# Patient Record
Sex: MALE | Race: BLACK OR AFRICAN AMERICAN | NOT HISPANIC OR LATINO | Employment: OTHER | URBAN - NONMETROPOLITAN AREA
[De-identification: names, ages, dates, MRNs, and addresses within clinical notes are randomized per-mention and may not be internally consistent; named-entity substitution may affect disease eponyms.]

---

## 2018-10-01 ENCOUNTER — HOSPITAL ENCOUNTER (INPATIENT)
Facility: HOSPITAL | Age: 28
LOS: 1 days | Discharge: HOME | End: 2018-10-02
Attending: FAMILY MEDICINE | Admitting: FAMILY MEDICINE

## 2018-10-01 DIAGNOSIS — M62.82 NON-TRAUMATIC RHABDOMYOLYSIS: Primary | ICD-10-CM

## 2018-10-01 PROBLEM — N17.9 ACUTE RENAL FAILURE DUE TO RHABDOMYOLYSIS: Status: ACTIVE | Noted: 2018-10-01

## 2018-10-01 PROBLEM — E66.09 CLASS 1 OBESITY DUE TO EXCESS CALORIES WITHOUT SERIOUS COMORBIDITY WITH BODY MASS INDEX (BMI) OF 32.0 TO 32.9 IN ADULT: Status: ACTIVE | Noted: 2018-10-01

## 2018-10-01 PROBLEM — E87.6 HYPOKALEMIA: Status: ACTIVE | Noted: 2018-10-01

## 2018-10-01 PROBLEM — R00.0 SINUS TACHYCARDIA: Status: ACTIVE | Noted: 2018-10-01

## 2018-10-01 LAB
ALBUMIN SERPL-MCNC: 5.1 G/DL (ref 3.4–4.8)
ALBUMIN/GLOB SERPL: 1.3 G/DL (ref 1.1–1.8)
ALP SERPL-CCNC: 113 U/L (ref 38–126)
ALT SERPL W P-5'-P-CCNC: 43 U/L (ref 21–72)
AMPHET+METHAMPHET UR QL: POSITIVE
ANION GAP SERPL CALCULATED.3IONS-SCNC: 24 MMOL/L (ref 5–15)
AST SERPL-CCNC: 95 U/L (ref 17–59)
BACTERIA UR QL AUTO: ABNORMAL /HPF
BARBITURATES UR QL SCN: NEGATIVE
BASOPHILS # BLD AUTO: 0.01 10*3/MM3 (ref 0–0.2)
BASOPHILS NFR BLD AUTO: 0 % (ref 0–2)
BENZODIAZ UR QL SCN: NEGATIVE
BILIRUB SERPL-MCNC: 1.4 MG/DL (ref 0.2–1.3)
BILIRUB UR QL STRIP: NEGATIVE
BUN BLD-MCNC: 12 MG/DL (ref 7–21)
BUN/CREAT SERPL: 8.1 (ref 7–25)
CALCIUM SPEC-SCNC: 10.5 MG/DL (ref 8.4–10.2)
CANNABINOIDS SERPL QL: NEGATIVE
CHLORIDE SERPL-SCNC: 98 MMOL/L (ref 95–110)
CK MB SERPL-CCNC: 10 NG/ML (ref 0–5)
CK SERPL-CCNC: 1512 U/L (ref 55–170)
CK SERPL-CCNC: 2532 U/L (ref 55–170)
CLARITY UR: CLEAR
CO2 SERPL-SCNC: 15 MMOL/L (ref 22–31)
COCAINE UR QL: NEGATIVE
COLOR UR: YELLOW
CREAT BLD-MCNC: 1.48 MG/DL (ref 0.7–1.3)
DEPRECATED RDW RBC AUTO: 41.7 FL (ref 35.1–43.9)
EOSINOPHIL # BLD AUTO: 0.01 10*3/MM3 (ref 0–0.7)
EOSINOPHIL NFR BLD AUTO: 0 % (ref 0–7)
ERYTHROCYTE [DISTWIDTH] IN BLOOD BY AUTOMATED COUNT: 13.1 % (ref 11.5–14.5)
GFR SERPL CREATININE-BSD FRML MDRD: 69 ML/MIN/1.73 (ref 77–179)
GLOBULIN UR ELPH-MCNC: 3.9 GM/DL (ref 2.3–3.5)
GLUCOSE BLD-MCNC: 194 MG/DL (ref 60–100)
GLUCOSE UR STRIP-MCNC: NEGATIVE MG/DL
HCT VFR BLD AUTO: 47.8 % (ref 39–49)
HGB BLD-MCNC: 17.2 G/DL (ref 13.7–17.3)
HGB UR QL STRIP.AUTO: NEGATIVE
HOLD SPECIMEN: NORMAL
HYALINE CASTS UR QL AUTO: ABNORMAL /LPF
IMM GRANULOCYTES # BLD: 0.09 10*3/MM3 (ref 0–0.02)
IMM GRANULOCYTES NFR BLD: 0.4 % (ref 0–0.5)
KETONES UR QL STRIP: ABNORMAL
LEUKOCYTE ESTERASE UR QL STRIP.AUTO: ABNORMAL
LYMPHOCYTES # BLD AUTO: 3.6 10*3/MM3 (ref 0.6–4.2)
LYMPHOCYTES NFR BLD AUTO: 17.8 % (ref 10–50)
MAGNESIUM SERPL-MCNC: 1.9 MG/DL (ref 1.6–2.3)
MCH RBC QN AUTO: 31 PG (ref 26.5–34)
MCHC RBC AUTO-ENTMCNC: 36 G/DL (ref 31.5–36.3)
MCV RBC AUTO: 86.3 FL (ref 80–98)
METHADONE UR QL SCN: NEGATIVE
MONOCYTES # BLD AUTO: 1.58 10*3/MM3 (ref 0–0.9)
MONOCYTES NFR BLD AUTO: 7.8 % (ref 0–12)
NEUTROPHILS # BLD AUTO: 14.89 10*3/MM3 (ref 2–8.6)
NEUTROPHILS NFR BLD AUTO: 74 % (ref 37–80)
NITRITE UR QL STRIP: NEGATIVE
NRBC BLD MANUAL-RTO: 0 /100 WBC (ref 0–0)
OPIATES UR QL: NEGATIVE
OXYCODONE UR QL SCN: NEGATIVE
PH UR STRIP.AUTO: 6 [PH] (ref 5–9)
PLATELET # BLD AUTO: 390 10*3/MM3 (ref 150–450)
PMV BLD AUTO: 10.6 FL (ref 8–12)
POTASSIUM BLD-SCNC: 2.7 MMOL/L (ref 3.5–5.1)
POTASSIUM BLD-SCNC: 3.4 MMOL/L (ref 3.5–5.1)
POTASSIUM BLD-SCNC: 3.7 MMOL/L (ref 3.5–5.1)
PROT SERPL-MCNC: 9 G/DL (ref 6.3–8.6)
PROT UR QL STRIP: NEGATIVE
RBC # BLD AUTO: 5.54 10*6/MM3 (ref 4.37–5.74)
RBC # UR: ABNORMAL /HPF
REF LAB TEST METHOD: ABNORMAL
SODIUM BLD-SCNC: 137 MMOL/L (ref 137–145)
SP GR UR STRIP: 1 (ref 1–1.03)
SQUAMOUS #/AREA URNS HPF: ABNORMAL /HPF
TROPONIN I SERPL-MCNC: 0.03 NG/ML
UROBILINOGEN UR QL STRIP: ABNORMAL
WBC NRBC COR # BLD: 20.18 10*3/MM3 (ref 3.2–9.8)
WBC UR QL AUTO: ABNORMAL /HPF
WHOLE BLOOD HOLD SPECIMEN: NORMAL

## 2018-10-01 PROCEDURE — 82550 ASSAY OF CK (CPK): CPT | Performed by: FAMILY MEDICINE

## 2018-10-01 PROCEDURE — 80307 DRUG TEST PRSMV CHEM ANLYZR: CPT | Performed by: FAMILY MEDICINE

## 2018-10-01 PROCEDURE — 85025 COMPLETE CBC W/AUTO DIFF WBC: CPT | Performed by: FAMILY MEDICINE

## 2018-10-01 PROCEDURE — 93005 ELECTROCARDIOGRAM TRACING: CPT | Performed by: FAMILY MEDICINE

## 2018-10-01 PROCEDURE — 99221 1ST HOSP IP/OBS SF/LOW 40: CPT | Performed by: FAMILY MEDICINE

## 2018-10-01 PROCEDURE — 81001 URINALYSIS AUTO W/SCOPE: CPT | Performed by: FAMILY MEDICINE

## 2018-10-01 PROCEDURE — 25010000003 POTASSIUM CHLORIDE 10 MEQ/100ML SOLUTION: Performed by: FAMILY MEDICINE

## 2018-10-01 PROCEDURE — 83735 ASSAY OF MAGNESIUM: CPT | Performed by: FAMILY MEDICINE

## 2018-10-01 PROCEDURE — 99285 EMERGENCY DEPT VISIT HI MDM: CPT

## 2018-10-01 PROCEDURE — 93010 ELECTROCARDIOGRAM REPORT: CPT | Performed by: INTERNAL MEDICINE

## 2018-10-01 PROCEDURE — 82553 CREATINE MB FRACTION: CPT | Performed by: FAMILY MEDICINE

## 2018-10-01 PROCEDURE — 80053 COMPREHEN METABOLIC PANEL: CPT | Performed by: FAMILY MEDICINE

## 2018-10-01 PROCEDURE — 84484 ASSAY OF TROPONIN QUANT: CPT | Performed by: FAMILY MEDICINE

## 2018-10-01 PROCEDURE — 84132 ASSAY OF SERUM POTASSIUM: CPT | Performed by: FAMILY MEDICINE

## 2018-10-01 RX ORDER — ACETAMINOPHEN 325 MG/1
650 TABLET ORAL EVERY 4 HOURS PRN
Status: DISCONTINUED | OUTPATIENT
Start: 2018-10-01 | End: 2018-10-02 | Stop reason: HOSPADM

## 2018-10-01 RX ORDER — SODIUM CHLORIDE 9 MG/ML
125 INJECTION, SOLUTION INTRAVENOUS CONTINUOUS
Status: DISCONTINUED | OUTPATIENT
Start: 2018-10-01 | End: 2018-10-01

## 2018-10-01 RX ORDER — SENNA AND DOCUSATE SODIUM 50; 8.6 MG/1; MG/1
2 TABLET, FILM COATED ORAL NIGHTLY PRN
Status: DISCONTINUED | OUTPATIENT
Start: 2018-10-01 | End: 2018-10-02 | Stop reason: HOSPADM

## 2018-10-01 RX ORDER — POTASSIUM CHLORIDE 750 MG/1
40 CAPSULE, EXTENDED RELEASE ORAL ONCE
Status: COMPLETED | OUTPATIENT
Start: 2018-10-01 | End: 2018-10-01

## 2018-10-01 RX ORDER — LORAZEPAM 1 MG/1
1 TABLET ORAL EVERY 6 HOURS PRN
Status: DISCONTINUED | OUTPATIENT
Start: 2018-10-01 | End: 2018-10-02 | Stop reason: HOSPADM

## 2018-10-01 RX ORDER — NICOTINE 21 MG/24HR
1 PATCH, TRANSDERMAL 24 HOURS TRANSDERMAL EVERY 24 HOURS
Status: DISCONTINUED | OUTPATIENT
Start: 2018-10-01 | End: 2018-10-02 | Stop reason: HOSPADM

## 2018-10-01 RX ORDER — SODIUM CHLORIDE 0.9 % (FLUSH) 0.9 %
1-10 SYRINGE (ML) INJECTION AS NEEDED
Status: DISCONTINUED | OUTPATIENT
Start: 2018-10-01 | End: 2018-10-02 | Stop reason: HOSPADM

## 2018-10-01 RX ORDER — POTASSIUM CHLORIDE 7.45 MG/ML
10 INJECTION INTRAVENOUS
Status: DISCONTINUED | OUTPATIENT
Start: 2018-10-01 | End: 2018-10-02 | Stop reason: HOSPADM

## 2018-10-01 RX ORDER — POTASSIUM CHLORIDE 7.45 MG/ML
10 INJECTION INTRAVENOUS ONCE
Status: COMPLETED | OUTPATIENT
Start: 2018-10-01 | End: 2018-10-01

## 2018-10-01 RX ORDER — POTASSIUM CHLORIDE 750 MG/1
40 CAPSULE, EXTENDED RELEASE ORAL 2 TIMES DAILY WITH MEALS
Status: DISCONTINUED | OUTPATIENT
Start: 2018-10-01 | End: 2018-10-02

## 2018-10-01 RX ORDER — SODIUM CHLORIDE 9 MG/ML
100 INJECTION, SOLUTION INTRAVENOUS CONTINUOUS
Status: DISCONTINUED | OUTPATIENT
Start: 2018-10-01 | End: 2018-10-02 | Stop reason: HOSPADM

## 2018-10-01 RX ADMIN — SODIUM CHLORIDE 1000 ML/HR: 9 INJECTION, SOLUTION INTRAVENOUS at 22:33

## 2018-10-01 RX ADMIN — SODIUM CHLORIDE 1000 ML/HR: 9 INJECTION, SOLUTION INTRAVENOUS at 19:29

## 2018-10-01 RX ADMIN — SODIUM CHLORIDE 125 ML/HR: 900 INJECTION, SOLUTION INTRAVENOUS at 12:40

## 2018-10-01 RX ADMIN — POTASSIUM CHLORIDE 40 MEQ: 750 CAPSULE, EXTENDED RELEASE ORAL at 13:30

## 2018-10-01 RX ADMIN — SODIUM CHLORIDE 1000 ML/HR: 9 INJECTION, SOLUTION INTRAVENOUS at 19:39

## 2018-10-01 RX ADMIN — SODIUM CHLORIDE 1000 ML/HR: 9 INJECTION, SOLUTION INTRAVENOUS at 16:39

## 2018-10-01 RX ADMIN — POTASSIUM CHLORIDE 40 MEQ: 750 CAPSULE, EXTENDED RELEASE ORAL at 17:51

## 2018-10-01 RX ADMIN — NICOTINE 1 PATCH: 21 PATCH TRANSDERMAL at 16:34

## 2018-10-01 RX ADMIN — SODIUM CHLORIDE 2925 ML: 900 INJECTION, SOLUTION INTRAVENOUS at 13:39

## 2018-10-01 RX ADMIN — LORAZEPAM 1 MG: 1 TABLET ORAL at 16:20

## 2018-10-01 RX ADMIN — SODIUM CHLORIDE 1000 ML/HR: 9 INJECTION, SOLUTION INTRAVENOUS at 16:34

## 2018-10-01 RX ADMIN — POTASSIUM CHLORIDE 10 MEQ: 10 INJECTION, SOLUTION INTRAVENOUS at 13:31

## 2018-10-01 RX ADMIN — SODIUM CHLORIDE 1000 ML: 900 INJECTION, SOLUTION INTRAVENOUS at 11:50

## 2018-10-01 RX ADMIN — SODIUM CHLORIDE 125 ML/HR: 900 INJECTION, SOLUTION INTRAVENOUS at 13:10

## 2018-10-01 NOTE — PLAN OF CARE
Problem: Patient Care Overview  Goal: Plan of Care Review  Outcome: Ongoing (interventions implemented as appropriate)  Pt oriented to [address]. Pt's anxiety level dropped after prn dose of Ativan.

## 2018-10-01 NOTE — ED PROVIDER NOTES
"Subjective   Pt states that he \"went down on a girl last night\" and her vaginal had a biter taste and was noted to be \"powdery,\" and he immediately started \"feeling bad.\"  He has not felt normal since then.        Illness   Location:  Generalized  Severity:  Mild  Onset quality:  Gradual  Duration:  1 day  Timing:  Constant  Progression:  Unchanged  Chronicity:  New  Relieved by:  Nothing  Worsened by:  Nothing  Ineffective treatments:  None tried  Associated symptoms: fatigue, myalgias and shortness of breath    Associated symptoms: no abdominal pain, no chest pain, no congestion, no cough, no diarrhea, no ear pain, no fever, no headaches, no loss of consciousness, no nausea, no rash, no rhinorrhea, no sore throat, no vomiting and no wheezing        Review of Systems   Constitutional: Positive for diaphoresis and fatigue. Negative for appetite change, chills and fever.   HENT: Negative for congestion, ear discharge, ear pain, nosebleeds, rhinorrhea, sinus pressure, sore throat and trouble swallowing.    Eyes: Negative for discharge and redness.   Respiratory: Positive for shortness of breath. Negative for apnea, cough, chest tightness and wheezing.    Cardiovascular: Negative for chest pain.   Gastrointestinal: Negative for abdominal pain, diarrhea, nausea and vomiting.   Endocrine: Negative for polyuria.   Genitourinary: Negative for dysuria, frequency and urgency.   Musculoskeletal: Positive for myalgias. Negative for neck pain.   Skin: Negative for color change and rash.   Allergic/Immunologic: Negative for immunocompromised state.   Neurological: Positive for dizziness. Negative for seizures, loss of consciousness, syncope, weakness, light-headedness and headaches.   Hematological: Negative for adenopathy. Does not bruise/bleed easily.   Psychiatric/Behavioral: Negative for behavioral problems and confusion.   All other systems reviewed and are negative.      History reviewed. No pertinent past medical " history.    No Known Allergies    History reviewed. No pertinent surgical history.    Family History   Problem Relation Age of Onset   • Heart disease Mother        Social History     Social History   • Marital status: Single     Social History Main Topics   • Smoking status: Current Every Day Smoker     Packs/day: 1.00   • Smokeless tobacco: Never Used   • Alcohol use Yes   • Drug use: Yes     Types: Marijuana   • Sexual activity: Yes     Partners: Female     Other Topics Concern   • Not on file           Objective   Physical Exam   Constitutional: He is oriented to person, place, and time. He appears well-developed and well-nourished.   HENT:   Head: Normocephalic and atraumatic.   Nose: Nose normal.   Mouth/Throat: Oropharynx is clear and moist.   Eyes: Pupils are equal, round, and reactive to light. Conjunctivae and EOM are normal. Right eye exhibits no discharge. Left eye exhibits no discharge. No scleral icterus.   Neck: Normal range of motion. Neck supple. No tracheal deviation present.   Cardiovascular: Regular rhythm and normal heart sounds.  Tachycardia present.    No murmur heard.  Pulmonary/Chest: Effort normal and breath sounds normal. No stridor. No respiratory distress. He has no wheezes. He has no rales.   Abdominal: Soft. Bowel sounds are normal. He exhibits no distension and no mass. There is no tenderness. There is no rebound and no guarding.   Musculoskeletal: He exhibits no edema.   Neurological: He is alert and oriented to person, place, and time. Coordination normal.   Skin: Skin is warm and dry. No rash noted. No erythema.   Psychiatric: He has a normal mood and affect. His behavior is normal. Thought content normal.   Nursing note and vitals reviewed.      ECG 12 Lead    Date/Time: 10/1/2018 1:49 PM  Performed by: DI GARCÍA  Authorized by: DI GARCÍA   Interpreted by physician  Rhythm: sinus tachycardia  Rate: tachycardic  BPM: 168  ST Segments: ST segments  normal                   ED Course          Labs Reviewed   COMPREHENSIVE METABOLIC PANEL - Abnormal; Notable for the following:        Result Value    Glucose 194 (*)     Creatinine 1.48 (*)     Potassium 2.7 (*)     CO2 15.0 (*)     Calcium 10.5 (*)     Total Protein 9.0 (*)     Albumin 5.10 (*)     AST (SGOT) 95 (*)     Total Bilirubin 1.4 (*)     eGFR  African Amer 69 (*)     Globulin 3.9 (*)     Anion Gap 24.0 (*)     All other components within normal limits   CK - Abnormal; Notable for the following:     Creatine Kinase 2,532 (*)     All other components within normal limits   CK MB - Abnormal; Notable for the following:     CKMB 10.00 (*)     All other components within normal limits   URINALYSIS W/ MICROSCOPIC IF INDICATED (NO CULTURE) - Abnormal; Notable for the following:     Ketones, UA 15 mg/dL (1+) (*)     Leuk Esterase, UA Trace (*)     All other components within normal limits   URINE DRUG SCREEN - Abnormal; Notable for the following:     Amphetamine Screen, Urine Positive (*)     All other components within normal limits    Narrative:     Negative Thresholds For Drugs Screened in Urine:     Amphetamines          500 ng/ml  Barbiturates          200 ng/ml  Benzodiazepines       200 ng/ml  Cocaine               150 ng/ml  Methadone             300 ng/mL  Opiates               300 ng/mL  Oxycodone             100 ng/mL  THC                   20 ng/mL    The normal value for all drugs tested is negative. This report includes final unconfirmed screening results.  A positive result by this assay can be, at your request, sent to the Reference Lab for confirmation by gas chromatography. Unconfirmed results must not be used for non-medical purposes, such as employment or legal testing. Clinical consideration should be applied to any drug of abuse test result, particularly when unconfirmed results are used.   CBC WITH AUTO DIFFERENTIAL - Abnormal; Notable for the following:     WBC 20.18 (*)     Neutrophils,  Absolute 14.89 (*)     Monocytes, Absolute 1.58 (*)     Immature Grans, Absolute 0.09 (*)     All other components within normal limits   URINALYSIS, MICROSCOPIC ONLY - Abnormal; Notable for the following:     RBC, UA 0-2 (*)     All other components within normal limits   TROPONIN (IN-HOUSE) - Normal   MAGNESIUM - Normal   CBC AND DIFFERENTIAL    Narrative:     The following orders were created for panel order CBC & Differential.  Procedure                               Abnormality         Status                     ---------                               -----------         ------                     CBC Auto Differential[[id number]]         Abnormal            Final result                 Please view results for these tests on the individual orders.   EXTRA TUBES    Narrative:     The following orders were created for panel order Extra Tubes.  Procedure                               Abnormality         Status                     ---------                               -----------         ------                     Light Blue Top[[id number]]                                    Final result               Gold Top - [facility][[id number]]                                    Final result                 Please view results for these tests on the individual orders.   LIGHT BLUE TOP   GOLD TOP - [facility]       No orders to display                 MDM      Final diagnoses:   Non-traumatic rhabdomyolysis            Tom Bronson MD  10/01/18 1548

## 2018-10-01 NOTE — H&P
"HISTORY AND PHYSICAL  NAME: Tai Deal  : 1990  MRN: [id number]    DATE OF ADMISSION: 10/01/18    DATE & TIME SEEN: 10/01/18 2:38 PM    PCP: Provider, No Known    CODE STATUS:   Code Status and Medical Interventions:   Ordered at: 10/01/18 1434     Level Of Support Discussed With:    Patient     Code Status:    CPR     Medical Interventions (Level of Support Prior to Arrest):    Full       CHIEF COMPLAINT  Chief Complaint   Patient presents with   • Rapid Heart Rate       HPI:  Tai Deal is a 27 y.o. AA male who presents with complaints of anxiety, muscle cramps, and dysesthesias after performing cunnilingus on a woman last night. Reports that he was immediately startled by the presence of a powder on the woman's genitals. Patient never saw the powder but reports that all sexual activity immediately ceased. He assumed it was poor hygiene and forced the woman to wash herself in his shower before she left. Patient woke up today feeling dehydrated with complaints of muscle cramping, \"locking up\". He also reported dysesthesias, perioral numbness and oral gritty sensation. Concerned that he \"may be dying\", patient came to the ED for further evaluation.      In the ED, patient was placed on a cardiac monitor and found to be in sinus tachycardia. This was confirmed with EKG. Furthermore, laboratory testing revealed an elevated CK of 2532 U/L, critically low potassium of 2.7 mmol/L, and acute renal failure with a creatinine of 1.48. Patient was started on aggressive IV fluid hydration and electrolyte replacement. Decision was made to admit the patient for further management of his non-traumatic rhabdomyolysis.    CONCURRENT MEDICAL HISTORY:  History reviewed. No pertinent past medical history.    PAST SURGICAL HISTORY:  History reviewed. No pertinent surgical history.    FAMILY HISTORY:  Family History   Problem Relation Age of Onset   • Heart disease Mother         SOCIAL HISTORY:  Social " History     Social History   • Marital status: Single     Spouse name: N/A   • Number of children: N/A   • Years of education: N/A     Occupational History   • Not on file.     Social History Main Topics   • Smoking status: Current Every Day Smoker     Packs/day: 1.00   • Smokeless tobacco: Never Used   • Alcohol use Yes   • Drug use: Yes     Types: Marijuana   • Sexual activity: Yes     Partners: Female     Other Topics Concern   • Not on file     Social History Narrative   • No narrative on file       HOME MEDICATIONS:  No current facility-administered medications on file prior to encounter.      No current outpatient prescriptions on file prior to encounter.       ALLERGIES:  Patient has no known allergies.    REVIEW OF SYSTEMS  Review of Systems   Constitutional: Negative for chills, diaphoresis, fatigue and fever.   HENT: Negative for congestion, rhinorrhea, sneezing and sore throat.    Respiratory: Negative for cough and shortness of breath.    Cardiovascular: Negative for chest pain and leg swelling.   Gastrointestinal: Positive for abdominal distention. Negative for abdominal pain, constipation, diarrhea, nausea and vomiting.   Genitourinary: Positive for decreased urine volume. Negative for difficulty urinating and hematuria.   Musculoskeletal: Positive for myalgias. Negative for gait problem and joint swelling.   Skin: Negative for rash and wound.   Neurological: Positive for numbness. Negative for seizures, syncope and headaches.        Dysesthesias   Psychiatric/Behavioral: Negative for confusion and sleep disturbance. The patient is nervous/anxious.        PHYSICAL EXAM:  Temp:  [98.3 °F (36.8 °C)] 98.3 °F (36.8 °C)  Heart Rate:  [120-169] 130  Resp:  [22-40] 22  BP: (157-162)/() 162/105  Body mass index is 32.69 kg/m².  Physical Exam   Constitutional: He is oriented to person, place, and time. He appears well-developed and well-nourished. No distress.   HENT:   Head: Normocephalic and atraumatic.    Right Ear: Tympanic membrane and ear canal normal.   Left Ear: Tympanic membrane and ear canal normal.   Mouth/Throat: Oropharynx is clear and moist.   Eyes: Pupils are equal, round, and reactive to light. EOM are normal. Right conjunctiva is injected. Left conjunctiva is injected. No scleral icterus.   Neck: Normal range of motion. Neck supple. No tracheal deviation present. No thyromegaly present.   Cardiovascular: Regular rhythm, normal heart sounds and intact distal pulses.  Tachycardia present.    Pulmonary/Chest: Effort normal and breath sounds normal. He has no wheezes. He has no rales.   Abdominal: Soft. Bowel sounds are normal. He exhibits distension. There is no tenderness.   Musculoskeletal: Normal range of motion. He exhibits no edema or tenderness.   Lymphadenopathy:     He has no cervical adenopathy.   Neurological: He is alert and oriented to person, place, and time.   Skin: Skin is warm and dry. No rash noted. He is not diaphoretic.   Psychiatric: Judgment and thought content normal. His mood appears anxious. He is agitated.   Nursing note and vitals reviewed.      DIAGNOSTIC DATA:   Lab Results (last 24 hours)     Procedure Component Value Units Date/Time    Extra Tubes [[id number]] Collected:  10/01/18 1239    Specimen:  Blood from Blood, Venous Line Updated:  10/01/18 0257    Narrative:       The following orders were created for panel order Extra Tubes.  Procedure                               Abnormality         Status                     ---------                               -----------         ------                     Light Blue Top[[id number]]                                    Final result               Gold Top - [facility][[id number]]                                    Final result                 Please view results for these tests on the individual orders.    Light Blue Top [[id number]] Collected:  10/01/18 1239    Specimen:  Blood Updated:  10/01/18 8661     Extra Tube hold for add-on      Comment: Auto resulted       Gold Top - [facility] [[id number]] Collected:  10/01/18 1240    Specimen:  Blood Updated:  10/01/18 1345     Extra Tube Hold for add-ons.     Comment: Auto resulted.       Urinalysis With Microscopic If Indicated (No Culture) - Urine, Clean Catch [[id number]]  (Abnormal) Collected:  10/01/18 1314    Specimen:  Urine from Urine, Clean Catch Updated:  10/01/18 1335     Color, UA Yellow     Appearance, UA Clear     pH, UA 6.0     Specific [place], UA 1.005     Comment: Result obtained by Refractometer        Glucose, UA Negative     Ketones, UA 15 mg/dL (1+) (A)     Bilirubin, UA Negative     Blood, UA Negative     Protein, UA Negative     Leuk Esterase, UA Trace (A)     Nitrite, UA Negative     Urobilinogen, UA 0.2 E.U./dL    Urinalysis, Microscopic Only - Urine, Clean Catch [[id number]]  (Abnormal) Collected:  10/01/18 1314    Specimen:  Urine from Urine, Clean Catch Updated:  10/01/18 1335     RBC, UA 0-2 (A) /HPF      WBC, UA 0-2 /HPF      Bacteria, UA None Seen /HPF      Squamous Epithelial Cells, UA None Seen /HPF      Hyaline Casts, UA None Seen /LPF      Methodology Automated Microscopy    Comprehensive Metabolic Panel [[id number]]  (Abnormal) Collected:  10/01/18 1239    Specimen:  Blood Updated:  10/01/18 1319     Glucose 194 (H) mg/dL      BUN 12 mg/dL      Creatinine 1.48 (H) mg/dL      Sodium 137 mmol/L      Potassium 2.7 (C) mmol/L      Chloride 98 mmol/L      CO2 15.0 (L) mmol/L      Calcium 10.5 (H) mg/dL      Total Protein 9.0 (H) g/dL      Albumin 5.10 (H) g/dL      ALT (SGPT) 43 U/L      AST (SGOT) 95 (H) U/L      Alkaline Phosphatase 113 U/L      Total Bilirubin 1.4 (H) mg/dL      eGFR   Amer 69 (L) mL/min/1.73      Globulin 3.9 (H) gm/dL      A/G Ratio 1.3 g/dL      BUN/Creatinine Ratio 8.1     Anion Gap 24.0 (H) mmol/L     Urine Drug Screen - Urine, Clean Catch [[id number]] Collected:  10/01/18 1314    Specimen:  Urine from Urine, Clean Catch Updated:  10/01/18 1318    CK  [[id number]]  (Abnormal) Collected:  10/01/18 1239    Specimen:  Blood Updated:  10/01/18 1311     Creatine Kinase 2,532 (H) U/L     CK-MB [[id number]]  (Abnormal) Collected:  10/01/18 1239    Specimen:  Blood Updated:  10/01/18 1309     CKMB 10.00 (H) ng/mL     Troponin [[id number]]  (Normal) Collected:  10/01/18 1239    Specimen:  Blood Updated:  10/01/18 1309     Troponin I 0.034 ng/mL     Magnesium [[id number]]  (Normal) Collected:  10/01/18 1239    Specimen:  Blood Updated:  10/01/18 1258     Magnesium 1.9 mg/dL     CBC & Differential [[id number]] Collected:  10/01/18 1239    Specimen:  Blood Updated:  10/01/18 1246    Narrative:       The following orders were created for panel order CBC & Differential.  Procedure                               Abnormality         Status                     ---------                               -----------         ------                     CBC Auto Differential[[id number]]         Abnormal            Final result                 Please view results for these tests on the individual orders.    CBC Auto Differential [[id number]]  (Abnormal) Collected:  10/01/18 1239    Specimen:  Blood Updated:  10/01/18 1246     WBC 20.18 (H) 10*3/mm3      RBC 5.54 10*6/mm3      Hemoglobin 17.2 g/dL      Hematocrit 47.8 %      MCV 86.3 fL      MCH 31.0 pg      MCHC 36.0 g/dL      RDW 13.1 %      RDW-SD 41.7 fl      MPV 10.6 fL      Platelets 390 10*3/mm3      Neutrophil % 74.0 %      Lymphocyte % 17.8 %      Monocyte % 7.8 %      Eosinophil % 0.0 %      Basophil % 0.0 %      Immature Grans % 0.4 %      Neutrophils, Absolute 14.89 (H) 10*3/mm3      Lymphocytes, Absolute 3.60 10*3/mm3      Monocytes, Absolute 1.58 (H) 10*3/mm3      Eosinophils, Absolute 0.01 10*3/mm3      Basophils, Absolute 0.01 10*3/mm3      Immature Grans, Absolute 0.09 (H) 10*3/mm3      nRBC 0.0 /100 WBC            Imaging Results (last 24 hours)     ** No results found for the last 24 hours. **          I reviewed the patient's new  clinical results.  I personally viewed and interpreted the patient's EKG/Telemetry data    ASSESSMENT AND PLAN: This is a 27 y.o. male with:    Active Hospital Problems    Diagnosis Date Noted   • **Non-traumatic rhabdomyolysis [M62.82] 10/01/2018     Priority: High     -Likely secondary to stimulant drug  -CK on admission: 2532 U/L  -Will provide IV fluid hydration  -Will monitor CK levels     • Acute renal failure due to rhabdomyolysis (CMS/HCC) [N17.9, M62.82] 10/01/2018     Priority: High     -Cr on admission: 1.48 mg/dL  -Will provide IV fluid hydration  -Will monitor with daily chemistry      • Hypokalemia [E87.6] 10/01/2018     Priority: Medium     -Potassium on admission: 2.7mmol/L  -S/p 40 mEq PO and 10 mEq IV in the ED  -Will continue potassium replacement  -Will monitor cardiac activity with telemetry     • Sinus tachycardia [R00.0] 10/01/2018     Priority: Medium     -Monitoring patient's cardiac activity with telemetry  -Likely secondary to inadvertent drug use     • Class 1 obesity due to excess calories without serious comorbidity with body mass index (BMI) of 32.0 to 32.9 in adult [E66.09, Z68.32] 10/01/2018     Priority: Low     -Body mass index is 32.69 kg/m².   -Encourage a low calorie diet and at least 30 minutes of moderate intensity activity at least 5 days a week         We will continue to monitor the patient's course and adjust our care accordingly.     DVT prophylaxis: SCD; PADUA score <4  Code status is   Code Status and Medical Interventions:   Ordered at: 10/01/18 4901     Level Of Support Discussed With:    Patient     Code Status:    CPR     Medical Interventions (Level of Support Prior to Arrest):    Full      SERGEY # [id number], reviewed and consistent with patient reported medications.    Anticipated length of stay: 2-3 nights    I discussed the patients findings and my recommendations with patient, nursing staff and primary care team.     Dr. Lani Alegria is the attending on record at  time of admission; she is aware of the patient's status and agrees with the above history and physical.    Signature  Ana Luisa Martinez MD  [facility] Family Medicine Resident, PGYIII        This document has been electronically signed by Ana Luisa Martinez MD on October 1, 2018 2:38 PM

## 2018-10-01 NOTE — ED NOTES
"Per pt report, last night pt performed oral sex on a female. Pt saw what he described as white powder on the females vagina. Pt states that he began having difficulty breathing & experienced rapid heart rate, numbness over his body, & that his penis \"feels weird\". Pt was very anxious on arrival & had a mouth full of old food. Pt states his mouth was too dry to swallow the food. When asked to perform any movement pt stated he was unable b/c his body is too numb. Pt is able to feel touch & sensation, but describes the feeling as dulled & states he is tingling all over. We packed cold packs around the pt, had him bare down & blow through a straw, & gave him fluids. We also had him slow his breathing. After interventions pt's heart rate dropped . Pt denies using any recreational substances      Aidan Blanco RN  10/01/18 2812    "

## 2018-10-01 NOTE — ED NOTES
Pt's mother at bedside. Pt's anxiety has increased after telling his mother what happened to bring him to the ED. Pt gave verbal permission to disclose all results with his mother     Aidan Blanco RN  10/01/18 6477

## 2018-10-02 VITALS
SYSTOLIC BLOOD PRESSURE: 125 MMHG | BODY MASS INDEX: 38.94 KG/M2 | WEIGHT: 256.9 LBS | TEMPERATURE: 97.4 F | DIASTOLIC BLOOD PRESSURE: 71 MMHG | RESPIRATION RATE: 18 BRPM | OXYGEN SATURATION: 97 % | HEART RATE: 89 BPM | HEIGHT: 68 IN

## 2018-10-02 PROBLEM — M62.82 ACUTE RENAL FAILURE DUE TO RHABDOMYOLYSIS (HCC): Status: RESOLVED | Noted: 2018-10-01 | Resolved: 2018-10-02

## 2018-10-02 PROBLEM — R00.0 SINUS TACHYCARDIA: Status: RESOLVED | Noted: 2018-10-01 | Resolved: 2018-10-02

## 2018-10-02 PROBLEM — E87.6 HYPOKALEMIA: Status: RESOLVED | Noted: 2018-10-01 | Resolved: 2018-10-02

## 2018-10-02 PROBLEM — N17.9 ACUTE RENAL FAILURE DUE TO RHABDOMYOLYSIS: Status: RESOLVED | Noted: 2018-10-01 | Resolved: 2018-10-02

## 2018-10-02 LAB
ANION GAP SERPL CALCULATED.3IONS-SCNC: 8 MMOL/L (ref 5–15)
BASOPHILS # BLD AUTO: 0.01 10*3/MM3 (ref 0–0.2)
BASOPHILS NFR BLD AUTO: 0.1 % (ref 0–2)
BUN BLD-MCNC: 7 MG/DL (ref 7–21)
BUN/CREAT SERPL: 7.4 (ref 7–25)
CALCIUM SPEC-SCNC: 8 MG/DL (ref 8.4–10.2)
CHLORIDE SERPL-SCNC: 109 MMOL/L (ref 95–110)
CK SERPL-CCNC: 1129 U/L (ref 55–170)
CO2 SERPL-SCNC: 21 MMOL/L (ref 22–31)
CREAT BLD-MCNC: 0.95 MG/DL (ref 0.7–1.3)
DEPRECATED RDW RBC AUTO: 44.9 FL (ref 35.1–43.9)
EOSINOPHIL # BLD AUTO: 0.06 10*3/MM3 (ref 0–0.7)
EOSINOPHIL NFR BLD AUTO: 0.5 % (ref 0–7)
ERYTHROCYTE [DISTWIDTH] IN BLOOD BY AUTOMATED COUNT: 13.8 % (ref 11.5–14.5)
GFR SERPL CREATININE-BSD FRML MDRD: 114 ML/MIN/1.73 (ref 77–179)
GLUCOSE BLD-MCNC: 78 MG/DL (ref 60–100)
HCT VFR BLD AUTO: 41.3 % (ref 39–49)
HGB BLD-MCNC: 14 G/DL (ref 13.7–17.3)
IMM GRANULOCYTES # BLD: 0.02 10*3/MM3 (ref 0–0.02)
IMM GRANULOCYTES NFR BLD: 0.2 % (ref 0–0.5)
LYMPHOCYTES # BLD AUTO: 3.56 10*3/MM3 (ref 0.6–4.2)
LYMPHOCYTES NFR BLD AUTO: 27.4 % (ref 10–50)
MCH RBC QN AUTO: 30.4 PG (ref 26.5–34)
MCHC RBC AUTO-ENTMCNC: 33.9 G/DL (ref 31.5–36.3)
MCV RBC AUTO: 89.6 FL (ref 80–98)
MONOCYTES # BLD AUTO: 0.93 10*3/MM3 (ref 0–0.9)
MONOCYTES NFR BLD AUTO: 7.2 % (ref 0–12)
NEUTROPHILS # BLD AUTO: 8.4 10*3/MM3 (ref 2–8.6)
NEUTROPHILS NFR BLD AUTO: 64.6 % (ref 37–80)
NRBC BLD MANUAL-RTO: 0 /100 WBC (ref 0–0)
PLATELET # BLD AUTO: 287 10*3/MM3 (ref 150–450)
PMV BLD AUTO: 10.2 FL (ref 8–12)
POTASSIUM BLD-SCNC: 4.1 MMOL/L (ref 3.5–5.1)
POTASSIUM BLD-SCNC: 4.1 MMOL/L (ref 3.5–5.1)
RBC # BLD AUTO: 4.61 10*6/MM3 (ref 4.37–5.74)
SODIUM BLD-SCNC: 138 MMOL/L (ref 137–145)
WBC NRBC COR # BLD: 12.98 10*3/MM3 (ref 3.2–9.8)

## 2018-10-02 PROCEDURE — 99232 SBSQ HOSP IP/OBS MODERATE 35: CPT | Performed by: STUDENT IN AN ORGANIZED HEALTH CARE EDUCATION/TRAINING PROGRAM

## 2018-10-02 PROCEDURE — 80048 BASIC METABOLIC PNL TOTAL CA: CPT | Performed by: FAMILY MEDICINE

## 2018-10-02 PROCEDURE — 85025 COMPLETE CBC W/AUTO DIFF WBC: CPT | Performed by: FAMILY MEDICINE

## 2018-10-02 PROCEDURE — 82550 ASSAY OF CK (CPK): CPT | Performed by: FAMILY MEDICINE

## 2018-10-02 PROCEDURE — 84132 ASSAY OF SERUM POTASSIUM: CPT | Performed by: FAMILY MEDICINE

## 2018-10-02 RX ORDER — NICOTINE 21 MG/24HR
1 PATCH, TRANSDERMAL 24 HOURS TRANSDERMAL EVERY 24 HOURS
Qty: 14 PATCH | Refills: 0 | OUTPATIENT
Start: 2018-10-02 | End: 2019-12-10

## 2018-10-02 RX ADMIN — SODIUM CHLORIDE 1000 ML/HR: 9 INJECTION, SOLUTION INTRAVENOUS at 02:00

## 2018-10-02 RX ADMIN — SODIUM CHLORIDE 1000 ML/HR: 9 INJECTION, SOLUTION INTRAVENOUS at 05:41

## 2018-10-02 RX ADMIN — SODIUM CHLORIDE 1000 ML/HR: 9 INJECTION, SOLUTION INTRAVENOUS at 05:42

## 2018-10-02 RX ADMIN — SODIUM CHLORIDE 1000 ML/HR: 9 INJECTION, SOLUTION INTRAVENOUS at 02:01

## 2018-10-02 NOTE — PROGRESS NOTES
FAMILY MEDICINE DAILY PROGRESS NOTE  NAME: Tai Deal  : 1990  MRN: [id number]     LOS: 1 day     PROVIDER OF SERVICE: Venkata Andersen MD    Chief Complaint: Non-traumatic rhabdomyolysis    Subjective:     Interval History:  History taken from: patient chart  Mr. Deal was seen this morning laying comfortably in bed and feels great. His vital signs were stable overnight. He denies any feelings of anxiety, muscle cramps, dyesthesias, perioral numbness or oral gritty sensation. He is complaining of some abdominal discomfort/bloating which he attributes to not having a bowel movement in the last two days. He is currently having flatus. Normally has 2-3 bowel movements a day and does not recall eating anything outside of his regular diet. Urine is light yellow today.     Review of Systems:   Review of Systems   Constitutional: Negative for fatigue and fever.   HENT: Negative for sore throat and trouble swallowing.    Respiratory: Negative for cough, chest tightness and shortness of breath.    Cardiovascular: Negative for chest pain and leg swelling.   Gastrointestinal: Positive for abdominal distention and abdominal pain.        He feels bloated   Genitourinary: Negative for difficulty urinating and dysuria.   Musculoskeletal: Negative for arthralgias and myalgias.   Skin: Negative for color change and pallor.   Neurological: Negative for dizziness, light-headedness and headaches.   Psychiatric/Behavioral: Negative for agitation and behavioral problems.       Objective:     Vital Signs  Temp:  [96.4 °F (35.8 °C)-98.6 °F (37 °C)] 98.1 °F (36.7 °C)  Heart Rate:  [] 73  Resp:  [18-40] 18  BP: (130-179)/() 130/81    Physical Exam  Physical Exam   Constitutional: He is oriented to person, place, and time. He appears well-developed and well-nourished.   HENT:   Head: Normocephalic and atraumatic.   Right Ear: External ear normal.   Left Ear: External ear normal.   Eyes: Pupils are equal,  round, and reactive to light. EOM are normal.   Neck: Normal range of motion. Neck supple.   Cardiovascular: Normal rate, regular rhythm and normal heart sounds.  Exam reveals no gallop and no friction rub.    No murmur heard.  Pulmonary/Chest: Effort normal and breath sounds normal. No respiratory distress. He has no wheezes. He has no rales.   Abdominal: Soft. Bowel sounds are normal. He exhibits no distension. There is no tenderness.   Musculoskeletal: Normal range of motion. He exhibits no edema.   Neurological: He is alert and oriented to person, place, and time.   Skin: Skin is warm. No erythema.   Psychiatric: He has a normal mood and affect. His behavior is normal.       Medication Review    Current Facility-Administered Medications:   •  acetaminophen (TYLENOL) tablet 650 mg, 650 mg, Oral, Q4H PRN, Ana Luisa Martinez MD  •  LORazepam (ATIVAN) tablet 1 mg, 1 mg, Oral, Q6H PRN, Ana Luisa Martinez MD, 1 mg at 10/01/18 1620  •  nicotine (NICODERM CQ) 21 MG/24HR patch 1 patch, 1 patch, Transdermal, Q24H, Ana Luisa Martinez MD, 1 patch at 10/01/18 1634  •  potassium chloride (MICRO-K) CR capsule 40 mEq, 40 mEq, Oral, BID With Meals, Ana Luisa Martinez MD, 40 mEq at 10/01/18 1751  •  potassium chloride 10 mEq in 100 mL IVPB, 10 mEq, Intravenous, Q1H PRN, Ana Luisa Martinez MD  •  sennosides-docusate sodium (SENOKOT-S) 8.6-50 MG tablet 2 tablet, 2 tablet, Oral, Nightly PRN, Ana Luisa Martinez MD  •  sodium chloride 0.9 % flush 1-10 mL, 1-10 mL, Intravenous, PRN, Ana Luisa Martinez MD  •  sodium chloride 0.9 % infusion, 100 mL/hr, Intravenous, Continuous, Venkata Andersen MD, Last Rate: 1,000 mL/hr at 10/02/18 0542, 1,000 mL/hr at 10/02/18 0542     Diagnostic Data    Lab Results (last 24 hours)     Procedure Component Value Units Date/Time    CBC & Differential [[id number]] Collected:  10/02/18 0542    Specimen:  Blood Updated:  10/02/18 0653    Narrative:       The following orders were  created for panel order CBC & Differential.  Procedure                               Abnormality         Status                     ---------                               -----------         ------                     CBC Auto Differential[[id number]]        Abnormal            Final result                 Please view results for these tests on the individual orders.    CBC Auto Differential [[id number]]  (Abnormal) Collected:  10/02/18 0542    Specimen:  Blood Updated:  10/02/18 0653     WBC 12.98 (H) 10*3/mm3      RBC 4.61 10*6/mm3      Hemoglobin 14.0 g/dL      Hematocrit 41.3 %      MCV 89.6 fL      MCH 30.4 pg      MCHC 33.9 g/dL      RDW 13.8 %      RDW-SD 44.9 (H) fl      MPV 10.2 fL      Platelets 287 10*3/mm3      Neutrophil % 64.6 %      Lymphocyte % 27.4 %      Monocyte % 7.2 %      Eosinophil % 0.5 %      Basophil % 0.1 %      Immature Grans % 0.2 %      Neutrophils, Absolute 8.40 10*3/mm3      Lymphocytes, Absolute 3.56 10*3/mm3      Monocytes, Absolute 0.93 (H) 10*3/mm3      Eosinophils, Absolute 0.06 10*3/mm3      Basophils, Absolute 0.01 10*3/mm3      Immature Grans, Absolute 0.02 10*3/mm3      nRBC 0.0 /100 WBC     Basic Metabolic Panel [[id number]]  (Abnormal) Collected:  10/02/18 0542    Specimen:  Blood Updated:  10/02/18 0651     Glucose 78 mg/dL      BUN 7 mg/dL      Creatinine 0.95 mg/dL      Sodium 138 mmol/L      Potassium 4.1 mmol/L      Chloride 109 mmol/L      CO2 21.0 (L) mmol/L      Calcium 8.0 (L) mg/dL      eGFR  African Amer 114 mL/min/1.73      BUN/Creatinine Ratio 7.4     Anion Gap 8.0 mmol/L     CK [[id number]]  (Abnormal) Collected:  10/02/18 0542    Specimen:  Blood Updated:  10/02/18 0648     Creatine Kinase 1,129 (H) U/L     Potassium [[id number]]  (Normal) Collected:  10/02/18 0542    Specimen:  Blood Updated:  10/02/18 0648     Potassium 4.1 mmol/L     Potassium [[id number]]  (Normal) Collected:  10/01/18 7704    Specimen:  Blood Updated:  10/01/18 7995     Potassium 3.7 mmol/L      Potassium [[id number]]  (Abnormal) Collected:  10/01/18 1656    Specimen:  Blood Updated:  10/01/18 1723     Potassium 3.4 (L) mmol/L     CK [[id number]]  (Abnormal) Collected:  10/01/18 1621    Specimen:  Blood Updated:  10/01/18 1642     Creatine Kinase 1,512 (H) U/L     Urine Drug Screen - Urine, Clean Catch [[id number]]  (Abnormal) Collected:  10/01/18 1314    Specimen:  Urine from Urine, Clean Catch Updated:  10/01/18 1509     Amphetamine Screen, Urine Positive (A)     Barbiturates Screen, Urine Negative     Benzodiazepine Screen, Urine Negative     Cocaine Screen, Urine Negative     Methadone Screen, Urine Negative     Opiate Screen Negative     Oxycodone Screen, Urine Negative     THC, Screen, Urine Negative    Narrative:       Negative Thresholds For Drugs Screened in Urine:     Amphetamines          500 ng/ml  Barbiturates          200 ng/ml  Benzodiazepines       200 ng/ml  Cocaine               150 ng/ml  Methadone             300 ng/mL  Opiates               300 ng/mL  Oxycodone             100 ng/mL  THC                   20 ng/mL    The normal value for all drugs tested is negative. This report includes final unconfirmed screening results.  A positive result by this assay can be, at your request, sent to the Reference Lab for confirmation by gas chromatography. Unconfirmed results must not be used for non-medical purposes, such as employment or legal testing. Clinical consideration should be applied to any drug of abuse test result, particularly when unconfirmed results are used.    Extra Tubes [[id number]] Collected:  10/01/18 1239    Specimen:  Blood from Blood, Venous Line Updated:  10/01/18 1345    Narrative:       The following orders were created for panel order Extra Tubes.  Procedure                               Abnormality         Status                     ---------                               -----------         ------                     Light Blue Top[[id number]]                                     Final result               Gold Top - SST[[id number]]                                    Final result                 Please view results for these tests on the individual orders.    Light Blue Top [[id number]] Collected:  10/01/18 1239    Specimen:  Blood Updated:  10/01/18 1345     Extra Tube hold for add-on     Comment: Auto resulted       Gold Top - SST [[id number]] Collected:  10/01/18 1240    Specimen:  Blood Updated:  10/01/18 1345     Extra Tube Hold for add-ons.     Comment: Auto resulted.       Urinalysis With Microscopic If Indicated (No Culture) - Urine, Clean Catch [[id number]]  (Abnormal) Collected:  10/01/18 1314    Specimen:  Urine from Urine, Clean Catch Updated:  10/01/18 1335     Color, UA Yellow     Appearance, UA Clear     pH, UA 6.0     Specific Gazelle, UA 1.005     Comment: Result obtained by Refractometer        Glucose, UA Negative     Ketones, UA 15 mg/dL (1+) (A)     Bilirubin, UA Negative     Blood, UA Negative     Protein, UA Negative     Leuk Esterase, UA Trace (A)     Nitrite, UA Negative     Urobilinogen, UA 0.2 E.U./dL    Urinalysis, Microscopic Only - Urine, Clean Catch [[id number]]  (Abnormal) Collected:  10/01/18 1314    Specimen:  Urine from Urine, Clean Catch Updated:  10/01/18 1335     RBC, UA 0-2 (A) /HPF      WBC, UA 0-2 /HPF      Bacteria, UA None Seen /HPF      Squamous Epithelial Cells, UA None Seen /HPF      Hyaline Casts, UA None Seen /LPF      Methodology Automated Microscopy    Comprehensive Metabolic Panel [[id number]]  (Abnormal) Collected:  10/01/18 1239    Specimen:  Blood Updated:  10/01/18 1319     Glucose 194 (H) mg/dL      BUN 12 mg/dL      Creatinine 1.48 (H) mg/dL      Sodium 137 mmol/L      Potassium 2.7 (C) mmol/L      Chloride 98 mmol/L      CO2 15.0 (L) mmol/L      Calcium 10.5 (H) mg/dL      Total Protein 9.0 (H) g/dL      Albumin 5.10 (H) g/dL      ALT (SGPT) 43 U/L      AST (SGOT) 95 (H) U/L      Alkaline Phosphatase 113 U/L      Total Bilirubin 1.4 (H)  mg/dL      eGFR   Amer 69 (L) mL/min/1.73      Globulin 3.9 (H) gm/dL      A/G Ratio 1.3 g/dL      BUN/Creatinine Ratio 8.1     Anion Gap 24.0 (H) mmol/L     CK [[id number]]  (Abnormal) Collected:  10/01/18 1239    Specimen:  Blood Updated:  10/01/18 1311     Creatine Kinase 2,532 (H) U/L     CK-MB [[id number]]  (Abnormal) Collected:  10/01/18 1239    Specimen:  Blood Updated:  10/01/18 1309     CKMB 10.00 (H) ng/mL     Troponin [[id number]]  (Normal) Collected:  10/01/18 1239    Specimen:  Blood Updated:  10/01/18 1309     Troponin I 0.034 ng/mL     Magnesium [[id number]]  (Normal) Collected:  10/01/18 1239    Specimen:  Blood Updated:  10/01/18 1258     Magnesium 1.9 mg/dL     CBC & Differential [[id number]] Collected:  10/01/18 1239    Specimen:  Blood Updated:  10/01/18 1246    Narrative:       The following orders were created for panel order CBC & Differential.  Procedure                               Abnormality         Status                     ---------                               -----------         ------                     CBC Auto Differential[[id number]]         Abnormal            Final result                 Please view results for these tests on the individual orders.    CBC Auto Differential [[id number]]  (Abnormal) Collected:  10/01/18 1239    Specimen:  Blood Updated:  10/01/18 1246     WBC 20.18 (H) 10*3/mm3      RBC 5.54 10*6/mm3      Hemoglobin 17.2 g/dL      Hematocrit 47.8 %      MCV 86.3 fL      MCH 31.0 pg      MCHC 36.0 g/dL      RDW 13.1 %      RDW-SD 41.7 fl      MPV 10.6 fL      Platelets 390 10*3/mm3      Neutrophil % 74.0 %      Lymphocyte % 17.8 %      Monocyte % 7.8 %      Eosinophil % 0.0 %      Basophil % 0.0 %      Immature Grans % 0.4 %      Neutrophils, Absolute 14.89 (H) 10*3/mm3      Lymphocytes, Absolute 3.60 10*3/mm3      Monocytes, Absolute 1.58 (H) 10*3/mm3      Eosinophils, Absolute 0.01 10*3/mm3      Basophils, Absolute 0.01 10*3/mm3      Immature Grans, Absolute 0.09 (H)  10*3/mm3      nRBC 0.0 /100 WBC            Imaging Results (last 24 hours)     ** No results found for the last 24 hours. **          I reviewed the patient's new clinical results.    Assessment/Plan:     Hospital Problem List     * (Principal)Non-traumatic rhabdomyolysis    Overview Addendum 10/2/2018  7:41 AM by Venkata Andersen MD     -Likely secondary to stimulant drug  -CK on admission: 2532 U/L. Today 1512.  -Will provide IV fluid hydration. Now 100cc/hr.  -Will monitor CK levels  - Cr today at 0.98         Hypokalemia    Overview Addendum 10/2/2018  7:42 AM by Venkata Andersen MD     -Potassium on admission: 2.7mmol/L. Now 4.1.  -S/p 40 mEq PO and 10 mEq IV in the ED  -Will monitor cardiac activity with telemetry         Acute renal failure due to rhabdomyolysis (CMS/HCC)    Overview Addendum 10/2/2018  7:43 AM by Venkata Andersen MD     -Cr on admission: 1.48 mg/dL. Today is 0.95  -Will provide IV fluid hydration  -Will monitor with daily chemistry          Sinus tachycardia    Overview Signed 10/1/2018  2:35 PM by Ana Luisa Martinez MD     -Monitoring patient's cardiac activity with telemetry  -Likely secondary to inadvertent drug use         Class 1 obesity due to excess calories without serious comorbidity with body mass index (BMI) of 32.0 to 32.9 in adult    Overview Signed 10/1/2018  2:38 PM by Ana Luisa Martinez MD     -Body mass index is 32.69 kg/m².   -Encourage a low calorie diet and at least 30 minutes of moderate intensity activity at least 5 days a week                 DVT prophylaxis: SCDs/TEDs  Code Status and Medical Interventions:   Ordered at: 10/01/18 7504     Level Of Support Discussed With:    Patient     Code Status:    CPR     Medical Interventions (Level of Support Prior to Arrest):    Full       Plan for disposition:Home today or tomorrow      Time: 15 min          This document has been electronically signed by Venkata Andersen MD on October 2, 2018 7:43  AM

## 2018-10-02 NOTE — PLAN OF CARE
Problem: Patient Care Overview  Goal: Plan of Care Review  Outcome: Ongoing (interventions implemented as appropriate)   10/02/18 0318   Coping/Psychosocial   Plan of Care Reviewed With patient   Plan of Care Review   Progress no change   OTHER   Outcome Summary Patient currently resting. VSS. Will continue to monitor.      Goal: Individualization and Mutuality  Outcome: Ongoing (interventions implemented as appropriate)    Goal: Discharge Needs Assessment  Outcome: Ongoing (interventions implemented as appropriate)    Goal: Interprofessional Rounds/Family Conf  Outcome: Ongoing (interventions implemented as appropriate)      Problem: Mood Impairment (Anxiety Signs/Symptoms) (Adult)  Goal: Improved Mood Symptoms (Anxiety Signs/Symptoms)  Outcome: Ongoing (interventions implemented as appropriate)      Problem: Anxiety (Adult)  Goal: Identify Related Risk Factors and Signs and Symptoms  Outcome: Ongoing (interventions implemented as appropriate)    Goal: Reduction/Resolution  Outcome: Ongoing (interventions implemented as appropriate)      Problem: Pain, Acute (Adult)  Goal: Identify Related Risk Factors and Signs and Symptoms  Outcome: Ongoing (interventions implemented as appropriate)    Goal: Acceptable Pain Control/Comfort Level  Outcome: Ongoing (interventions implemented as appropriate)

## 2018-10-02 NOTE — DISCHARGE SUMMARY
[address] [address]  T - [phone]     DISCHARGE SUMMARY         PATIENT  DEMOGRAPHICS   PATIENT NAME: Tai Deal                      PHYSICIAN: Venkata Andersen MD  : 1990  MRN: [id number]    ADMISSION/DISCHARGE INFO   ADMISSION DATE: 10/1/2018   DISCHARGE DATE: 10/02/18    ADMISSION DIAGNOSES: Non-traumatic rhabdomyolysis [M62.82]  DISCHARGE DIAGNOSES: Non-traumatic rhabdomyolysis [M62.82]    Principal Problem:    Non-traumatic rhabdomyolysis  Overview:  Active Problems:    Class 1 obesity due to excess calories without serious comorbidity with body mass index (BMI) of 32.0 to 32.9 in adult  Overview:  Resolved Problems:    Hypokalemia  Overview:    Acute renal failure due to rhabdomyolysis (CMS/[facility])  Overview:    Sinus tachycardia  Overview:      SERVICE: Family Medicine  ATTENDING PROVIDER: Dr. Alegria   RESIDENT: Venkata Andersen MD     CONSULTS   Consult Orders (all)     Start     Ordered    10/01/18 1352  Family Practice - Resident (on-call MD unless specified)  Once     Specialty:  Family Medicine  Provider:  Ana Luisa Martinez MD    10/01/18 1353          PROCEDURES     Imaging Results (last 24 hours)     ** No results found for the last 24 hours. **          No results found.    HISTORY OF PRESENT ILLNESS   Tai Deal is a 27 y.o. male who cam to the [facility] ED after he was performing cunnilingus and felt a powder like substance in his mouth from the ladies genitals. The next day he woke up feeling dehydrated with complaints of muscle cramping, dysesthesias, perioral numbness, and an oral gritty sensation.      DIAGNOSTIC DATA   Sinus tachycardia on EKG, CK of 2532, Potassium of 2.7, Cr of 1.48, and a urine drug screen positive for amphetamines.    HOSPITAL COURSE   In the [facility] ED was started on aggressive IV fluid hydration and electrolyte replacement which was continued overnight. The next day  his symptoms resolved, Cr returned to normal and CK trended down to 1129. He tolerated PO diet well and was decided he was able to go home and follow up with his PCP.    Physical Exam   Constitutional: He is oriented to person, place, and time. He appears well-developed and well-nourished.   HENT:   Head: Normocephalic and atraumatic.   Right Ear: External ear normal.   Left Ear: External ear normal.   Eyes: Pupils are equal, round, and reactive to light. EOM are normal.   Neck: Normal range of motion. Neck supple.   Cardiovascular: Normal rate, regular rhythm and normal heart sounds.  Exam reveals no gallop and no friction rub.    No murmur heard.  Pulmonary/Chest: Effort normal and breath sounds normal. No respiratory distress. He has no wheezes. He has no rales.   Abdominal: Soft. Bowel sounds are normal. He exhibits no distension. There is no tenderness.   Musculoskeletal: Normal range of motion. He exhibits no edema.   Neurological: He is alert and oriented to person, place, and time.   Skin: Skin is warm. No erythema.   Psychiatric: He has a normal mood and affect. His behavior is normal.      DISCHARGE CONDITION   Stable    DISPOSITION   To Home      DISCHARGE MEDICATIONS        Your medication list      START taking these medications      Instructions Last Dose Given Next Dose Due   nicotine 21 MG/24HR patch  Commonly known as:  NICODERM CQ      Place 1 patch on the skin as directed by provider Daily.             Where to Get Your Medications      These medications were sent to [facility]/pharmacy #[id number] - [place], [place] - [address] [place] [address] - [phone]  - [phone] [facility] [address]    Phone:  [phone] ·   nicotine 21 MG/24HR patch         INSTRUCTIONS   Activity:   Activity Instructions     Activity as Tolerated             Diet:   Diet Instructions     Diet: Regular       Discharge Diet:  Regular          Special Instructions: Patient instructed to call M.D. or return to ED  with worsening shortness of breath, chest pain, fever greater than 100.4°F or any other medical concerns.    FOLLOW UP   Additional Instructions for the Follow-ups that You Need to Schedule     Discharge Follow-up with PCP    As directed      Currently Documented PCP:  Venkata Andersen MD  PCP Phone Number:  [phone]    Follow Up Details:  Please follow up within one to two weeks.           Follow-up Information     Venkata Andersen MD Follow up.    Specialty:  Family Medicine  Why:  Please follow up within one to two weeks.  Contact information:  [facility] [address] [place] [address] [phone]                  PENDING TEST RESULTS AT DISCHARGE   None   TIME   Time: 30 minutes were spent planning this discharge.          Dr. Alegria  is the attending at time of discharge, She is aware of the patient's status and agrees with the above discharge summary.           This document has been electronically signed by Venkata Andersen MD on October 2, 2018 11:26 AM

## 2018-10-02 NOTE — PLAN OF CARE
Problem: Patient Care Overview  Goal: Plan of Care Review  Outcome: Ongoing (interventions implemented as appropriate)   10/02/18 1123   Coping/Psychosocial   Plan of Care Reviewed With patient   Plan of Care Review   Progress improving

## 2019-01-23 ENCOUNTER — HOSPITAL ENCOUNTER (EMERGENCY)
Facility: HOSPITAL | Age: 29
Discharge: HOME | End: 2019-01-23
Attending: EMERGENCY MEDICINE | Admitting: EMERGENCY MEDICINE

## 2019-01-23 VITALS
HEIGHT: 69 IN | OXYGEN SATURATION: 100 % | RESPIRATION RATE: 20 BRPM | BODY MASS INDEX: 31.84 KG/M2 | HEART RATE: 80 BPM | WEIGHT: 215 LBS | DIASTOLIC BLOOD PRESSURE: 67 MMHG | SYSTOLIC BLOOD PRESSURE: 117 MMHG | TEMPERATURE: 97.7 F

## 2019-01-23 DIAGNOSIS — N39.0 UTI (URINARY TRACT INFECTION) WITH PYURIA: Primary | ICD-10-CM

## 2019-01-23 LAB
ALBUMIN SERPL-MCNC: 4.8 G/DL (ref 3.4–4.8)
ALBUMIN/GLOB SERPL: 1.8 G/DL (ref 1.1–1.8)
ALP SERPL-CCNC: 72 U/L (ref 38–126)
ALT SERPL W P-5'-P-CCNC: 31 U/L (ref 21–72)
ANION GAP SERPL CALCULATED.3IONS-SCNC: 7 MMOL/L (ref 5–15)
AST SERPL-CCNC: 33 U/L (ref 17–59)
BACTERIA UR QL AUTO: ABNORMAL /HPF
BASOPHILS # BLD AUTO: 0.02 10*3/MM3 (ref 0–0.2)
BASOPHILS NFR BLD AUTO: 0.2 % (ref 0–2)
BILIRUB SERPL-MCNC: 0.4 MG/DL (ref 0.2–1.3)
BILIRUB UR QL STRIP: NEGATIVE
BUN BLD-MCNC: 16 MG/DL (ref 7–21)
BUN/CREAT SERPL: 13 (ref 7–25)
CALCIUM SPEC-SCNC: 9.9 MG/DL (ref 8.4–10.2)
CHLORIDE SERPL-SCNC: 98 MMOL/L (ref 95–110)
CLARITY UR: CLEAR
CO2 SERPL-SCNC: 29 MMOL/L (ref 22–31)
COLOR UR: YELLOW
CREAT BLD-MCNC: 1.23 MG/DL (ref 0.7–1.3)
DEPRECATED RDW RBC AUTO: 43.1 FL (ref 35.1–43.9)
EOSINOPHIL # BLD AUTO: 0.05 10*3/MM3 (ref 0–0.7)
EOSINOPHIL NFR BLD AUTO: 0.4 % (ref 0–7)
ERYTHROCYTE [DISTWIDTH] IN BLOOD BY AUTOMATED COUNT: 13.4 % (ref 11.5–14.5)
GFR SERPL CREATININE-BSD FRML MDRD: 84 ML/MIN/1.73 (ref 77–179)
GLOBULIN UR ELPH-MCNC: 2.6 GM/DL (ref 2.3–3.5)
GLUCOSE BLD-MCNC: 93 MG/DL (ref 60–100)
GLUCOSE UR STRIP-MCNC: NEGATIVE MG/DL
HCT VFR BLD AUTO: 46.9 % (ref 39–49)
HGB BLD-MCNC: 16.2 G/DL (ref 13.7–17.3)
HGB UR QL STRIP.AUTO: NEGATIVE
HOLD SPECIMEN: NORMAL
HOLD SPECIMEN: NORMAL
HYALINE CASTS UR QL AUTO: ABNORMAL /LPF
IMM GRANULOCYTES # BLD AUTO: 0.04 10*3/MM3 (ref 0–0.02)
IMM GRANULOCYTES NFR BLD AUTO: 0.4 % (ref 0–0.5)
KETONES UR QL STRIP: NEGATIVE
LEUKOCYTE ESTERASE UR QL STRIP.AUTO: ABNORMAL
LIPASE SERPL-CCNC: 90 U/L (ref 23–300)
LYMPHOCYTES # BLD AUTO: 2.43 10*3/MM3 (ref 0.6–4.2)
LYMPHOCYTES NFR BLD AUTO: 21.8 % (ref 10–50)
MCH RBC QN AUTO: 30.5 PG (ref 26.5–34)
MCHC RBC AUTO-ENTMCNC: 34.5 G/DL (ref 31.5–36.3)
MCV RBC AUTO: 88.2 FL (ref 80–98)
MONOCYTES # BLD AUTO: 0.74 10*3/MM3 (ref 0–0.9)
MONOCYTES NFR BLD AUTO: 6.6 % (ref 0–12)
NEUTROPHILS # BLD AUTO: 7.89 10*3/MM3 (ref 2–8.6)
NEUTROPHILS NFR BLD AUTO: 70.6 % (ref 37–80)
NITRITE UR QL STRIP: NEGATIVE
PH UR STRIP.AUTO: 6 [PH] (ref 5–9)
PLATELET # BLD AUTO: 287 10*3/MM3 (ref 150–450)
PMV BLD AUTO: 9.9 FL (ref 8–12)
POTASSIUM BLD-SCNC: 4.3 MMOL/L (ref 3.5–5.1)
PROT SERPL-MCNC: 7.4 G/DL (ref 6.3–8.6)
PROT UR QL STRIP: NEGATIVE
RBC # BLD AUTO: 5.32 10*6/MM3 (ref 4.37–5.74)
RBC # UR: ABNORMAL /HPF
REF LAB TEST METHOD: ABNORMAL
SODIUM BLD-SCNC: 134 MMOL/L (ref 137–145)
SP GR UR STRIP: 1.02 (ref 1–1.03)
SQUAMOUS #/AREA URNS HPF: ABNORMAL /HPF
UROBILINOGEN UR QL STRIP: ABNORMAL
WBC NRBC COR # BLD: 11.17 10*3/MM3 (ref 3.2–9.8)
WBC UR QL AUTO: ABNORMAL /HPF
WHOLE BLOOD HOLD SPECIMEN: NORMAL
WHOLE BLOOD HOLD SPECIMEN: NORMAL

## 2019-01-23 PROCEDURE — 81001 URINALYSIS AUTO W/SCOPE: CPT

## 2019-01-23 PROCEDURE — 85025 COMPLETE CBC W/AUTO DIFF WBC: CPT

## 2019-01-23 PROCEDURE — 99283 EMERGENCY DEPT VISIT LOW MDM: CPT

## 2019-01-23 PROCEDURE — 80053 COMPREHEN METABOLIC PANEL: CPT

## 2019-01-23 PROCEDURE — 83690 ASSAY OF LIPASE: CPT

## 2019-01-23 RX ORDER — LOPERAMIDE HYDROCHLORIDE 2 MG/1
2 CAPSULE ORAL 4 TIMES DAILY PRN
Qty: 8 CAPSULE | Refills: 0 | OUTPATIENT
Start: 2019-01-23 | End: 2019-12-10

## 2019-01-23 RX ORDER — CIPROFLOXACIN 500 MG/1
500 TABLET, FILM COATED ORAL EVERY 12 HOURS
Qty: 20 TABLET | Refills: 0 | Status: SHIPPED | OUTPATIENT
Start: 2019-01-23 | End: 2019-02-02

## 2019-01-23 RX ORDER — SODIUM CHLORIDE 0.9 % (FLUSH) 0.9 %
10 SYRINGE (ML) INJECTION AS NEEDED
Status: DISCONTINUED | OUTPATIENT
Start: 2019-01-23 | End: 2019-01-23 | Stop reason: HOSPADM

## 2019-01-23 RX ORDER — DEXTROMETHORPHAN HYDROBROMIDE AND PROMETHAZINE HYDROCHLORIDE 15; 6.25 MG/5ML; MG/5ML
5 SYRUP ORAL 4 TIMES DAILY PRN
Qty: 180 ML | Refills: 0 | OUTPATIENT
Start: 2019-01-23 | End: 2019-12-10

## 2019-01-23 NOTE — DISCHARGE INSTRUCTIONS
Increase PO fluid intake  Return ED fever, vomiting, dehydration, bleeding, abdominal pain, worse condition, other concerns

## 2019-02-16 ENCOUNTER — APPOINTMENT (OUTPATIENT)
Dept: GENERAL RADIOLOGY | Facility: HOSPITAL | Age: 29
End: 2019-02-16

## 2019-02-16 ENCOUNTER — HOSPITAL ENCOUNTER (EMERGENCY)
Facility: HOSPITAL | Age: 29
Discharge: HOME | End: 2019-02-16
Attending: EMERGENCY MEDICINE | Admitting: EMERGENCY MEDICINE

## 2019-02-16 VITALS
HEIGHT: 67 IN | SYSTOLIC BLOOD PRESSURE: 151 MMHG | TEMPERATURE: 98.5 F | WEIGHT: 210 LBS | DIASTOLIC BLOOD PRESSURE: 91 MMHG | OXYGEN SATURATION: 99 % | RESPIRATION RATE: 16 BRPM | HEART RATE: 109 BPM | BODY MASS INDEX: 32.96 KG/M2

## 2019-02-16 DIAGNOSIS — F15.10 AMPHETAMINE ABUSE (HCC): Primary | ICD-10-CM

## 2019-02-16 LAB
ALBUMIN SERPL-MCNC: 4.9 G/DL (ref 3.4–4.8)
ALBUMIN/GLOB SERPL: 1.7 G/DL (ref 1.1–1.8)
ALP SERPL-CCNC: 73 U/L (ref 38–126)
ALT SERPL W P-5'-P-CCNC: 20 U/L (ref 21–72)
AMPHET+METHAMPHET UR QL: POSITIVE
ANION GAP SERPL CALCULATED.3IONS-SCNC: 13 MMOL/L (ref 5–15)
APTT PPP: 27.3 SECONDS (ref 20–40.3)
AST SERPL-CCNC: 41 U/L (ref 17–59)
BARBITURATES UR QL SCN: NEGATIVE
BASOPHILS # BLD AUTO: 0.05 10*3/MM3 (ref 0–0.2)
BASOPHILS NFR BLD AUTO: 0.3 % (ref 0–1.5)
BENZODIAZ UR QL SCN: NEGATIVE
BILIRUB SERPL-MCNC: 0.4 MG/DL (ref 0.2–1.3)
BILIRUB UR QL STRIP: NEGATIVE
BUN BLD-MCNC: 11 MG/DL (ref 7–21)
BUN/CREAT SERPL: 9.6 (ref 7–25)
CALCIUM SPEC-SCNC: 10 MG/DL (ref 8.4–10.2)
CANNABINOIDS SERPL QL: NEGATIVE
CHLORIDE SERPL-SCNC: 98 MMOL/L (ref 95–110)
CK SERPL-CCNC: 318 U/L (ref 55–170)
CLARITY UR: CLEAR
CO2 SERPL-SCNC: 24 MMOL/L (ref 22–31)
COCAINE UR QL: NEGATIVE
COLOR UR: YELLOW
CREAT BLD-MCNC: 1.14 MG/DL (ref 0.7–1.3)
DEPRECATED RDW RBC AUTO: 41.4 FL (ref 37–54)
EOSINOPHIL # BLD AUTO: 0.02 10*3/MM3 (ref 0–0.4)
EOSINOPHIL NFR BLD AUTO: 0.1 % (ref 0.3–6.2)
ERYTHROCYTE [DISTWIDTH] IN BLOOD BY AUTOMATED COUNT: 13.2 % (ref 12.3–15.4)
ETHANOL BLD-MCNC: <10 MG/DL (ref 0–10)
ETHANOL UR QL: <0.01 %
GFR SERPL CREATININE-BSD FRML MDRD: 92 ML/MIN/1.73 (ref 77–179)
GLOBULIN UR ELPH-MCNC: 2.9 GM/DL (ref 2.3–3.5)
GLUCOSE BLD-MCNC: 123 MG/DL (ref 60–100)
GLUCOSE UR STRIP-MCNC: NEGATIVE MG/DL
HCT VFR BLD AUTO: 41.5 % (ref 37.5–51)
HGB BLD-MCNC: 14.6 G/DL (ref 13–17.7)
HGB UR QL STRIP.AUTO: NEGATIVE
HOLD SPECIMEN: NORMAL
HOLD SPECIMEN: NORMAL
IMM GRANULOCYTES # BLD AUTO: 0.06 10*3/MM3 (ref 0–0.05)
IMM GRANULOCYTES NFR BLD AUTO: 0.4 % (ref 0–0.5)
INR PPP: 0.97 (ref 0.8–1.2)
KETONES UR QL STRIP: NEGATIVE
LEUKOCYTE ESTERASE UR QL STRIP.AUTO: NEGATIVE
LIPASE SERPL-CCNC: 88 U/L (ref 23–300)
LYMPHOCYTES # BLD AUTO: 2.52 10*3/MM3 (ref 0.7–3.1)
LYMPHOCYTES NFR BLD AUTO: 17.5 % (ref 19.6–45.3)
MCH RBC QN AUTO: 30.5 PG (ref 26.6–33)
MCHC RBC AUTO-ENTMCNC: 35.2 G/DL (ref 31.5–35.7)
MCV RBC AUTO: 86.8 FL (ref 79–97)
METHADONE UR QL SCN: NEGATIVE
MONOCYTES # BLD AUTO: 0.87 10*3/MM3 (ref 0.1–0.9)
MONOCYTES NFR BLD AUTO: 6.1 % (ref 5–12)
NEUTROPHILS # BLD AUTO: 10.85 10*3/MM3 (ref 1.4–7)
NEUTROPHILS NFR BLD AUTO: 75.6 % (ref 42.7–76)
NITRITE UR QL STRIP: NEGATIVE
NRBC BLD AUTO-RTO: 0 /100 WBC (ref 0–0)
OPIATES UR QL: NEGATIVE
OXYCODONE UR QL SCN: NEGATIVE
PH UR STRIP.AUTO: 5.5 [PH] (ref 5–9)
PLATELET # BLD AUTO: 447 10*3/MM3 (ref 140–450)
PMV BLD AUTO: 9.8 FL (ref 6–12)
POTASSIUM BLD-SCNC: 3.4 MMOL/L (ref 3.5–5.1)
PROT SERPL-MCNC: 7.8 G/DL (ref 6.3–8.6)
PROT UR QL STRIP: NEGATIVE
PROTHROMBIN TIME: 12.7 SECONDS (ref 11.1–15.3)
RBC # BLD AUTO: 4.78 10*6/MM3 (ref 4.14–5.8)
SODIUM BLD-SCNC: 135 MMOL/L (ref 137–145)
SP GR UR STRIP: 1.01 (ref 1–1.03)
UROBILINOGEN UR QL STRIP: NORMAL
WBC NRBC COR # BLD: 14.37 10*3/MM3 (ref 3.4–10.8)
WHOLE BLOOD HOLD SPECIMEN: NORMAL

## 2019-02-16 PROCEDURE — 81003 URINALYSIS AUTO W/O SCOPE: CPT | Performed by: STUDENT IN AN ORGANIZED HEALTH CARE EDUCATION/TRAINING PROGRAM

## 2019-02-16 PROCEDURE — 93010 ELECTROCARDIOGRAM REPORT: CPT | Performed by: INTERNAL MEDICINE

## 2019-02-16 PROCEDURE — 96375 TX/PRO/DX INJ NEW DRUG ADDON: CPT

## 2019-02-16 PROCEDURE — 80053 COMPREHEN METABOLIC PANEL: CPT | Performed by: STUDENT IN AN ORGANIZED HEALTH CARE EDUCATION/TRAINING PROGRAM

## 2019-02-16 PROCEDURE — 80307 DRUG TEST PRSMV CHEM ANLYZR: CPT | Performed by: EMERGENCY MEDICINE

## 2019-02-16 PROCEDURE — 85610 PROTHROMBIN TIME: CPT | Performed by: STUDENT IN AN ORGANIZED HEALTH CARE EDUCATION/TRAINING PROGRAM

## 2019-02-16 PROCEDURE — 96361 HYDRATE IV INFUSION ADD-ON: CPT

## 2019-02-16 PROCEDURE — 85025 COMPLETE CBC W/AUTO DIFF WBC: CPT | Performed by: STUDENT IN AN ORGANIZED HEALTH CARE EDUCATION/TRAINING PROGRAM

## 2019-02-16 PROCEDURE — 85730 THROMBOPLASTIN TIME PARTIAL: CPT | Performed by: STUDENT IN AN ORGANIZED HEALTH CARE EDUCATION/TRAINING PROGRAM

## 2019-02-16 PROCEDURE — 25010000002 ONDANSETRON PER 1 MG: Performed by: EMERGENCY MEDICINE

## 2019-02-16 PROCEDURE — 96374 THER/PROPH/DIAG INJ IV PUSH: CPT

## 2019-02-16 PROCEDURE — 93005 ELECTROCARDIOGRAM TRACING: CPT | Performed by: EMERGENCY MEDICINE

## 2019-02-16 PROCEDURE — 25010000002 LORAZEPAM PER 2 MG: Performed by: STUDENT IN AN ORGANIZED HEALTH CARE EDUCATION/TRAINING PROGRAM

## 2019-02-16 PROCEDURE — 82550 ASSAY OF CK (CPK): CPT | Performed by: STUDENT IN AN ORGANIZED HEALTH CARE EDUCATION/TRAINING PROGRAM

## 2019-02-16 PROCEDURE — 74022 RADEX COMPL AQT ABD SERIES: CPT

## 2019-02-16 PROCEDURE — 99284 EMERGENCY DEPT VISIT MOD MDM: CPT

## 2019-02-16 PROCEDURE — 83690 ASSAY OF LIPASE: CPT | Performed by: STUDENT IN AN ORGANIZED HEALTH CARE EDUCATION/TRAINING PROGRAM

## 2019-02-16 RX ORDER — LORAZEPAM 2 MG/ML
1 INJECTION INTRAMUSCULAR ONCE
Status: COMPLETED | OUTPATIENT
Start: 2019-02-16 | End: 2019-02-16

## 2019-02-16 RX ORDER — ONDANSETRON 2 MG/ML
4 INJECTION INTRAMUSCULAR; INTRAVENOUS ONCE
Status: COMPLETED | OUTPATIENT
Start: 2019-02-16 | End: 2019-02-16

## 2019-02-16 RX ORDER — SODIUM CHLORIDE 0.9 % (FLUSH) 0.9 %
10 SYRINGE (ML) INJECTION AS NEEDED
Status: DISCONTINUED | OUTPATIENT
Start: 2019-02-16 | End: 2019-02-16 | Stop reason: HOSPADM

## 2019-02-16 RX ADMIN — SODIUM CHLORIDE 1000 ML: 900 INJECTION, SOLUTION INTRAVENOUS at 09:51

## 2019-02-16 RX ADMIN — SODIUM CHLORIDE 1000 ML: 900 INJECTION, SOLUTION INTRAVENOUS at 11:54

## 2019-02-16 RX ADMIN — ONDANSETRON 4 MG: 2 INJECTION INTRAMUSCULAR; INTRAVENOUS at 09:58

## 2019-02-16 RX ADMIN — LORAZEPAM 1 MG: 2 INJECTION, SOLUTION INTRAMUSCULAR; INTRAVENOUS at 09:44

## 2019-02-16 NOTE — ED NOTES
"Pt on phone, yelling for his mom to come to the ER because he is \"freaking out and going to die.\"     Kelli Adair RN  02/16/19 9900    "

## 2019-02-16 NOTE — ED NOTES
Pt currently in radiology dept. Per discussion with pt's cousin, pt's baseline neuro status is normally bizarre/similar to current state.     Kelli Adair RN  02/16/19 8780

## 2019-02-16 NOTE — ED PROVIDER NOTES
Subjective   Mr. Deal is a 28 year old male who comes in to the ED complaining of hematemesis. He went out drinking last night and had 2 pints of alcohol and no other substances. He went to sleep and woke up today needing to urinate. Once urinating he felt the need to vomit and when he did he say she had some blood in his vomit. The details of his story change in between that time and about 1-1.5 hours before presentation when he said the hematemesis worsened so he came to the ED. He has been drinking red and purple Gatorade and says that the blood in his mouth does not taste like blood. He has a previous history of methamphetamine abuse.           Review of Systems   Constitutional: Positive for activity change and diaphoresis. Negative for appetite change and fever.   HENT: Negative for congestion and trouble swallowing.    Respiratory: Negative for chest tightness and shortness of breath.    Cardiovascular: Negative for chest pain and palpitations.   Gastrointestinal: Positive for nausea and vomiting. Negative for abdominal distention, abdominal pain and diarrhea.   Genitourinary: Negative for difficulty urinating and dysuria.   Musculoskeletal: Negative for arthralgias and myalgias.   Skin: Negative for color change and pallor.   Neurological: Positive for light-headedness. Negative for syncope and headaches.   Psychiatric/Behavioral: Positive for agitation. Negative for behavioral problems.       History reviewed. No pertinent past medical history.    No Known Allergies    History reviewed. No pertinent surgical history.    Family History   Problem Relation Age of Onset   • Heart disease Mother        Social History     Socioeconomic History   • Marital status: Single     Spouse name: Not on file   • Number of children: Not on file   • Years of education: Not on file   • Highest education level: Not on file   Tobacco Use   • Smoking status: Current Every Day Smoker     Packs/day: 1.00   • Smokeless tobacco:  Never Used   Substance and Sexual Activity   • Alcohol use: No     Frequency: Never   • Drug use: No   • Sexual activity: Yes     Partners: Female       Objective    Vitals:    02/16/19 1054   BP: 161/99   Pulse: 118   Resp:    Temp:    SpO2: 100%     Physical Exam   Constitutional: He is oriented to person, place, and time. He appears well-developed and well-nourished. He appears distressed.   HENT:   Head: Normocephalic and atraumatic.   Right Ear: External ear normal.   Left Ear: External ear normal.   Eyes: EOM are normal. Pupils are equal, round, and reactive to light.   Neck: Normal range of motion. Neck supple.   Cardiovascular: Regular rhythm and normal heart sounds. Tachycardia present. Exam reveals no gallop and no friction rub.   No murmur heard.  Pulmonary/Chest: Effort normal and breath sounds normal. Tachypnea noted. No respiratory distress. He has no wheezes. He has no rales.   Abdominal: Soft. Bowel sounds are normal. He exhibits no distension. There is no tenderness.   Musculoskeletal: Normal range of motion. He exhibits no edema.   Neurological: He is alert and oriented to person, place, and time.   Skin: Skin is warm. No erythema.   Psychiatric: His mood appears anxious. His speech is rapid and/or pressured. He is agitated. Thought content is paranoid.     Procedures       ED Course      In the ED he was positive for methamphetamines, had a CK of 318, and a white count of 14.37. His abdominal and chest xray showed a normal gas pattern and no acute cardiopulmonary process, respectively. He was agitated and restless so was given 1mg of ativan to calm him down. To address his CK level he was given 2L of normal saline. He improved and was stable for discharge.        MDM  Number of Diagnoses or Management Options  Amphetamine abuse (CMS/HCC): new and requires workup     Amount and/or Complexity of Data Reviewed  Clinical lab tests: reviewed and ordered  Tests in the radiology section of CPT®: reviewed  and ordered  Tests in the medicine section of CPT®: reviewed and ordered  Discuss the patient with other providers: yes    Risk of Complications, Morbidity, and/or Mortality  Presenting problems: moderate  Diagnostic procedures: moderate  Management options: moderate    Patient Progress  Patient progress: improved      Final diagnoses:   Amphetamine abuse (CMS/[facility])        Venkata Andersen MD  Resident  02/16/19 6119

## 2019-02-16 NOTE — ED NOTES
Pt states he feels better. Cousin states pt has history of HTN and is non-compliant with meds.     Kelli Adair RN  02/16/19 4068

## 2019-02-16 NOTE — ED NOTES
Pt dc'd to lobby to await transportation home by cousin. DC paperwork given to security with instruction that pt is not to drive due to receiving Ativan.     Kelli Adair, RN  02/16/19 0225

## 2019-02-16 NOTE — ED NOTES
Pt very anxious, restless. States he drank about 2 pints last night and was chasing it with salt. Also states he drank red Powerade this am. Pt states he keeps feeling like he has a powdery substance in his throat from smoking his vape.     Kelli Adair, RN  02/16/19 6163

## 2019-12-10 ENCOUNTER — HOSPITAL ENCOUNTER (EMERGENCY)
Facility: HOSPITAL | Age: 29
Discharge: HOME | End: 2019-12-10
Attending: EMERGENCY MEDICINE | Admitting: EMERGENCY MEDICINE

## 2019-12-10 VITALS
RESPIRATION RATE: 20 BRPM | DIASTOLIC BLOOD PRESSURE: 84 MMHG | BODY MASS INDEX: 33.62 KG/M2 | OXYGEN SATURATION: 94 % | TEMPERATURE: 98.2 F | HEIGHT: 69 IN | WEIGHT: 227 LBS | SYSTOLIC BLOOD PRESSURE: 163 MMHG | HEART RATE: 97 BPM

## 2019-12-10 DIAGNOSIS — K04.7 DENTAL INFECTION: Primary | ICD-10-CM

## 2019-12-10 PROCEDURE — 99283 EMERGENCY DEPT VISIT LOW MDM: CPT

## 2019-12-10 RX ORDER — MAGNESIUM HYDROXIDE/ALUMINUM HYDROXICE/SIMETHICONE 120; 1200; 1200 MG/30ML; MG/30ML; MG/30ML
30 SUSPENSION ORAL ONCE
Status: COMPLETED | OUTPATIENT
Start: 2019-12-10 | End: 2019-12-10

## 2019-12-10 RX ORDER — PENICILLIN V POTASSIUM 500 MG/1
500 TABLET ORAL ONCE
Status: COMPLETED | OUTPATIENT
Start: 2019-12-10 | End: 2019-12-10

## 2019-12-10 RX ORDER — LIDOCAINE HYDROCHLORIDE 20 MG/ML
15 SOLUTION OROPHARYNGEAL
Status: DISCONTINUED | OUTPATIENT
Start: 2019-12-10 | End: 2019-12-10 | Stop reason: HOSPADM

## 2019-12-10 RX ORDER — PENICILLIN V POTASSIUM 500 MG/1
500 TABLET ORAL 4 TIMES DAILY
Qty: 40 TABLET | Refills: 0 | Status: SHIPPED | OUTPATIENT
Start: 2019-12-10 | End: 2019-12-20

## 2019-12-10 RX ADMIN — LIDOCAINE HYDROCHLORIDE 15 ML: 20 SOLUTION ORAL; TOPICAL at 06:56

## 2019-12-10 RX ADMIN — PENICILLIN V POTASSIUM 500 MG: 500 TABLET ORAL at 06:55

## 2019-12-10 RX ADMIN — BENZOCAINE, BUTAMBEN, AND TETRACAINE HYDROCHLORIDE 1 SPRAY: .028; .004; .004 AEROSOL, SPRAY TOPICAL at 06:56

## 2019-12-10 RX ADMIN — ALUMINUM HYDROXIDE, MAGNESIUM HYDROXIDE, DIMETHICONE 30 ML: 200; 200; 20 SUSPENSION ORAL at 06:56

## 2019-12-10 NOTE — ED PROVIDER NOTES
Subjective   29-year-old male presents with complaint of sudden onset of right-sided facial swelling and pain.  He denies any known dental injury or dental caries.  States symptoms began yesterday and worsened overnight.  Denies any difficulty swallowing or breathing.    Family history, surgical history, social history, current medications and allergies are reviewed with the patient and triage documentation and vitals are reviewed.        History provided by:  Patient   used: No        Review of Systems   Constitutional: Negative for chills and fever.   HENT: Positive for dental problem and facial swelling. Negative for congestion, drooling, ear discharge, ear pain, sinus pressure, sinus pain, sore throat, trouble swallowing and voice change.    All other systems reviewed and are negative.      History reviewed. No pertinent past medical history.    Allergies   Allergen Reactions   • Wasp Venom Swelling       History reviewed. No pertinent surgical history.    Family History   Problem Relation Age of Onset   • Heart disease Mother        Social History     Socioeconomic History   • Marital status: Single     Spouse name: Not on file   • Number of children: Not on file   • Years of education: Not on file   • Highest education level: Not on file   Tobacco Use   • Smoking status: Current Every Day Smoker     Packs/day: 1.00   • Smokeless tobacco: Never Used   • Tobacco comment: 7-8 cigarettes daily    Substance and Sexual Activity   • Alcohol use: Yes     Frequency: Never   • Drug use: No     Types: Marijuana   • Sexual activity: Yes     Partners: Female           Objective   Physical Exam   Constitutional: He is oriented to person, place, and time. He appears well-developed and well-nourished.   HENT:   Mouth/Throat: Uvula is midline and mucous membranes are normal. Oral lesions present. Dental caries present. No uvula swelling. No oropharyngeal exudate, posterior oropharyngeal edema, posterior  oropharyngeal erythema or tonsillar abscesses. Tonsils are 1+ on the right. Tonsils are 1+ on the left.       Cardiovascular: Normal rate and regular rhythm.   Pulmonary/Chest: Effort normal and breath sounds normal.   Neurological: He is alert and oriented to person, place, and time.   Skin: Skin is warm and dry. Capillary refill takes less than 2 seconds.   Nursing note and vitals reviewed.      Procedures  none         ED Course    Labs Reviewed - No data to display  No results found.          MDM  Number of Diagnoses or Management Options  Dental infection:   Patient Progress  Patient progress: stable    Patient with some rubbing trauma to the buccal mucosa due to the swelling of the soft tissues.  There is obvious dental caries to the right mandibular region and tenderness to the outer surface with no palpable or visualized abscess.  Patient started on Pen-Vee K and is given cotton balls soaked with Maalox, Cetacaine, and lidocaine viscous to help with discomfort advised on immediate follow-up with dentistry.    Final diagnoses:   Dental infection              Jacob Walker, DO  12/10/19 0628

## 2019-12-10 NOTE — DISCHARGE INSTRUCTIONS
Please return with new or worsening symptoms.  Please use the topical medication provided as directed.  Get antibiotic filled and take as directed for the complete course and follow-up with dentistry as soon as possible.

## 2021-11-29 NOTE — ED PROVIDER NOTES
Subjective   27yo male presents ED c/o 2d hx nasal congestion/nonproductive cough/sore throat with (1) day hx dysuria/diarrheal stools x2.  ROS neg fever/chills/chest pain/abd pain/n/v/melena/hematochoezia/ hematemesis.        History provided by:  Patient  Illness   Severity:  Mild  Onset quality:  Sudden  Timing:  Constant  Chronicity:  New  Associated symptoms: congestion, cough, diarrhea, fatigue and sore throat    Associated symptoms: no abdominal pain, no fever, no nausea, no shortness of breath and no vomiting        Review of Systems   Constitutional: Positive for fatigue. Negative for fever.   HENT: Positive for congestion and sore throat.    Eyes: Negative for redness.   Respiratory: Positive for cough. Negative for shortness of breath.    Cardiovascular: Negative.    Gastrointestinal: Positive for diarrhea. Negative for abdominal pain, blood in stool, nausea and vomiting.   Genitourinary: Positive for dysuria. Negative for flank pain.   Musculoskeletal: Negative.    Skin: Negative.    All other systems reviewed and are negative.      History reviewed. No pertinent past medical history.    No Known Allergies    History reviewed. No pertinent surgical history.    Family History   Problem Relation Age of Onset   • Heart disease Mother        Social History     Socioeconomic History   • Marital status: Single     Spouse name: Not on file   • Number of children: Not on file   • Years of education: Not on file   • Highest education level: Not on file   Tobacco Use   • Smoking status: Current Every Day Smoker     Packs/day: 1.00   • Smokeless tobacco: Never Used   Substance and Sexual Activity   • Alcohol use: No     Frequency: Never   • Drug use: No   • Sexual activity: Yes     Partners: Female           Objective   Physical Exam   Constitutional: He is oriented to person, place, and time. He appears well-developed and well-nourished.   HENT:   Head: Normocephalic and atraumatic.   Mouth/Throat: Oropharynx is  clear and moist.   Eyes: Pupils are equal, round, and reactive to light.   Neck: Normal range of motion. Neck supple. No JVD present. No tracheal deviation present.   Cardiovascular: Normal rate, regular rhythm, normal heart sounds and intact distal pulses. Exam reveals no gallop and no friction rub.   No murmur heard.  Pulmonary/Chest: Effort normal and breath sounds normal. He has no wheezes. He has no rales.   Abdominal: Soft. Bowel sounds are normal. He exhibits no distension and no mass. There is no tenderness. There is no rigidity, no rebound, no guarding, no CVA tenderness, no tenderness at McBurney's point and negative Rosas's sign.   Musculoskeletal: He exhibits no edema.   Lymphadenopathy:     He has no cervical adenopathy.   Neurological: He is alert and oriented to person, place, and time.   Skin: Skin is warm and dry.   Nursing note and vitals reviewed.      Procedures           ED Course        Labs Reviewed   COMPREHENSIVE METABOLIC PANEL - Abnormal; Notable for the following components:       Result Value    Sodium 134 (*)     All other components within normal limits   URINALYSIS W/ MICROSCOPIC IF INDICATED (NO CULTURE) - Abnormal; Notable for the following components:    Leuk Esterase, UA Small (1+) (*)     All other components within normal limits   CBC WITH AUTO DIFFERENTIAL - Abnormal; Notable for the following components:    WBC 11.17 (*)     Immature Grans, Absolute 0.04 (*)     All other components within normal limits   URINALYSIS, MICROSCOPIC ONLY - Abnormal; Notable for the following components:    RBC, UA 0-2 (*)     WBC, UA 6-12 (*)     All other components within normal limits   LIPASE - Normal   RAINBOW DRAW    Narrative:     The following orders were created for panel order [place] Draw.  Procedure                               Abnormality         Status                     ---------                               -----------         ------                     Light Blue  Time-based billing (NON-critical care) Top[[id number]]                                   Final result               Green Top (Gel)[[id number]]                                  Final result               Lavender Top[[id number]]                                     Final result               Gold Top - SST[[id number]]                                   Final result                 Please view results for these tests on the individual orders.   CBC AND DIFFERENTIAL    Narrative:     The following orders were created for panel order CBC & Differential.  Procedure                               Abnormality         Status                     ---------                               -----------         ------                     CBC Auto Differential[[id number]]        Abnormal            Final result                 Please view results for these tests on the individual orders.   LIGHT BLUE TOP   GREEN TOP   LAVENDER TOP   GOLD TOP - SST               [facility]      Final diagnoses:   UTI (urinary tract infection) with pyuria            Emiliano Daniels MD  01/23/19 172     Time-based billing (NON-critical care)

## 2023-05-31 ENCOUNTER — HOSPITAL ENCOUNTER (EMERGENCY)
Facility: HOSPITAL | Age: 33
Discharge: HOME | End: 2023-05-31
Attending: EMERGENCY MEDICINE
Payer: MEDICAID

## 2023-05-31 VITALS
HEIGHT: 69 IN | RESPIRATION RATE: 18 BRPM | OXYGEN SATURATION: 98 % | HEART RATE: 69 BPM | BODY MASS INDEX: 34.52 KG/M2 | DIASTOLIC BLOOD PRESSURE: 71 MMHG | WEIGHT: 233.1 LBS | SYSTOLIC BLOOD PRESSURE: 121 MMHG | TEMPERATURE: 98.3 F

## 2023-05-31 DIAGNOSIS — T14.8XXA BLISTER: Primary | ICD-10-CM

## 2023-05-31 LAB
BASOPHILS # BLD AUTO: 0.13 10*3/MM3 (ref 0–0.2)
BASOPHILS NFR BLD AUTO: 0.9 % (ref 0–1.5)
DEPRECATED RDW RBC AUTO: 41.1 FL (ref 37–54)
EOSINOPHIL # BLD AUTO: 0.14 10*3/MM3 (ref 0–0.4)
EOSINOPHIL NFR BLD AUTO: 1 % (ref 0.3–6.2)
ERYTHROCYTE [DISTWIDTH] IN BLOOD BY AUTOMATED COUNT: 12.7 % (ref 12.3–15.4)
HCT VFR BLD AUTO: 42 % (ref 37.5–51)
HGB BLD-MCNC: 14.6 G/DL (ref 13–17.7)
IMM GRANULOCYTES # BLD AUTO: 0.09 10*3/MM3 (ref 0–0.05)
IMM GRANULOCYTES NFR BLD AUTO: 0.7 % (ref 0–0.5)
LYMPHOCYTES # BLD AUTO: 3.79 10*3/MM3 (ref 0.7–3.1)
LYMPHOCYTES NFR BLD AUTO: 27.5 % (ref 19.6–45.3)
MCH RBC QN AUTO: 30.7 PG (ref 26.6–33)
MCHC RBC AUTO-ENTMCNC: 34.8 G/DL (ref 31.5–35.7)
MCV RBC AUTO: 88.2 FL (ref 79–97)
MONOCYTES # BLD AUTO: 0.73 10*3/MM3 (ref 0.1–0.9)
MONOCYTES NFR BLD AUTO: 5.3 % (ref 5–12)
NEUTROPHILS NFR BLD AUTO: 64.6 % (ref 42.7–76)
NEUTROPHILS NFR BLD AUTO: 8.89 10*3/MM3 (ref 1.7–7)
NRBC BLD AUTO-RTO: 0 /100 WBC (ref 0–0.2)
PLATELET # BLD AUTO: 321 10*3/MM3 (ref 140–450)
PMV BLD AUTO: 9.7 FL (ref 6–12)
RBC # BLD AUTO: 4.76 10*6/MM3 (ref 4.14–5.8)
WBC NRBC COR # BLD: 13.77 10*3/MM3 (ref 3.4–10.8)

## 2023-05-31 PROCEDURE — 36415 COLL VENOUS BLD VENIPUNCTURE: CPT

## 2023-05-31 PROCEDURE — 85025 COMPLETE CBC W/AUTO DIFF WBC: CPT | Performed by: EMERGENCY MEDICINE

## 2023-05-31 PROCEDURE — 99283 EMERGENCY DEPT VISIT LOW MDM: CPT

## 2023-06-01 NOTE — ED NOTES
"Presents to the ED dues to generalized weakness. Pt states that he has been \"just feeling bad\" for about two hours. Pt also reports having a callus on the heel of his foot that is very painful and wants to be looked at.  "

## 2023-06-01 NOTE — ED PROVIDER NOTES
Subjective   History of Present Illness  32 year old male presents to the ED with complaint of a painful lesion on the posterior aspect of his right foot. Reports it hurts when he walks, is painful to touch and seems to fluid filled. Denies fever, chills or systemic symptoms. Denies diabetes history. It is located at the level of the top of the heel of his sneaker.     Family history, surgical history, social history, current medications and allergies are reviewed with the patient and triage documentation and vitals are reviewed.      History provided by:  Patient   used: No      Review of Systems   Constitutional:  Negative for chills and fever.   Musculoskeletal:  Negative for arthralgias and myalgias.   Skin:  Positive for wound.   Neurological:  Negative for weakness, light-headedness, numbness and headaches.   All other systems reviewed and are negative.    No past medical history on file.    Allergies   Allergen Reactions    Wasp Venom Swelling       No past surgical history on file.    Family History   Problem Relation Age of Onset    Heart disease Mother        Social History     Socioeconomic History    Marital status: Single   Tobacco Use    Smoking status: Every Day     Packs/day: 1.00     Types: Cigarettes    Smokeless tobacco: Never    Tobacco comments:     7-8 cigarettes daily    Substance and Sexual Activity    Alcohol use: Yes    Drug use: No     Types: Marijuana    Sexual activity: Yes     Partners: Female           Objective   Physical Exam  Vitals and nursing note reviewed.   Constitutional:       Appearance: Normal appearance. He is normal weight and overweight.   HENT:      Head: Normocephalic.   Cardiovascular:      Rate and Rhythm: Normal rate and regular rhythm.      Pulses: Normal pulses.   Pulmonary:      Effort: Pulmonary effort is normal.      Breath sounds: Normal breath sounds.   Musculoskeletal:         General: Normal range of motion.        Feet:    Skin:      General: Skin is warm and dry.      Capillary Refill: Capillary refill takes less than 2 seconds.   Neurological:      Mental Status: He is alert.       Procedures  none         ED Course      Labs Reviewed   CBC WITH AUTO DIFFERENTIAL - Abnormal; Notable for the following components:       Result Value    WBC 13.77 (*)     Immature Grans % 0.7 (*)     Neutrophils, Absolute 8.89 (*)     Lymphocytes, Absolute 3.79 (*)     Immature Grans, Absolute 0.09 (*)     All other components within normal limits   CBC AND DIFFERENTIAL    Narrative:     The following orders were created for panel order CBC & Differential.  Procedure                               Abnormality         Status                     ---------                               -----------         ------                     CBC Auto Differential[[id number]]        Abnormal            Final result                 Please view results for these tests on the individual orders.     No results found.        Medical Decision Making  Problems Addressed:  Blister: complicated acute illness or injury    Amount and/or Complexity of Data Reviewed  Labs: ordered.    Exam consistent with blister. No secondary signs of infection. Advised on care and allowing to heal. Advised not to rupture. Advised to follow-up with PCP.     Final diagnoses:   Blister          ED Disposition  ED Disposition       ED Disposition   Discharge    Condition   Stable    Comment   --               Randal Lopez, [facility]  [address]  [facility] [address] [place] [id number] [phone]    Schedule an appointment as soon as possible for a visit            Medication List      No changes were made to your prescriptions during this visit.          Jacob Walker,   06/05/23 1724

## 2023-06-10 ENCOUNTER — HOSPITAL ENCOUNTER (EMERGENCY)
Facility: HOSPITAL | Age: 33
Discharge: HOME | End: 2023-06-10
Attending: FAMILY MEDICINE
Payer: MEDICAID

## 2023-06-10 VITALS
SYSTOLIC BLOOD PRESSURE: 146 MMHG | BODY MASS INDEX: 34.66 KG/M2 | TEMPERATURE: 98.2 F | RESPIRATION RATE: 20 BRPM | WEIGHT: 234 LBS | HEART RATE: 125 BPM | OXYGEN SATURATION: 100 % | HEIGHT: 69 IN | DIASTOLIC BLOOD PRESSURE: 95 MMHG

## 2023-06-10 DIAGNOSIS — S61.210A LACERATION OF RIGHT INDEX FINGER WITHOUT FOREIGN BODY WITHOUT DAMAGE TO NAIL, INITIAL ENCOUNTER: Primary | ICD-10-CM

## 2023-06-10 PROCEDURE — 90471 IMMUNIZATION ADMIN: CPT | Performed by: FAMILY MEDICINE

## 2023-06-10 PROCEDURE — 99283 EMERGENCY DEPT VISIT LOW MDM: CPT

## 2023-06-10 PROCEDURE — 25010000002 TETANUS-DIPHTH-ACELL PERTUSSIS 5-2.5-18.5 LF-MCG/0.5 SUSPENSION PREFILLED SYRINGE: Performed by: FAMILY MEDICINE

## 2023-06-10 PROCEDURE — 90715 TDAP VACCINE 7 YRS/> IM: CPT | Performed by: FAMILY MEDICINE

## 2023-06-10 RX ORDER — LIDOCAINE HYDROCHLORIDE 10 MG/ML
10 INJECTION, SOLUTION EPIDURAL; INFILTRATION; INTRACAUDAL; PERINEURAL ONCE
Status: COMPLETED | OUTPATIENT
Start: 2023-06-10 | End: 2023-06-10

## 2023-06-10 RX ORDER — DIAPER,BRIEF,INFANT-TODD,DISP
1 EACH MISCELLANEOUS ONCE
Status: COMPLETED | OUTPATIENT
Start: 2023-06-10 | End: 2023-06-10

## 2023-06-10 RX ORDER — CEPHALEXIN 500 MG/1
500 CAPSULE ORAL ONCE
Status: COMPLETED | OUTPATIENT
Start: 2023-06-10 | End: 2023-06-10

## 2023-06-10 RX ORDER — CEPHALEXIN 500 MG/1
500 CAPSULE ORAL 4 TIMES DAILY
Qty: 28 CAPSULE | Refills: 0 | Status: SHIPPED | OUTPATIENT
Start: 2023-06-10

## 2023-06-10 RX ADMIN — CEPHALEXIN 500 MG: 500 CAPSULE ORAL at 03:56

## 2023-06-10 RX ADMIN — LIDOCAINE HYDROCHLORIDE 10 ML: 10 INJECTION, SOLUTION EPIDURAL; INFILTRATION; INTRACAUDAL; PERINEURAL at 04:07

## 2023-06-10 RX ADMIN — TETANUS TOXOID, REDUCED DIPHTHERIA TOXOID AND ACELLULAR PERTUSSIS VACCINE, ADSORBED 0.5 ML: 5; 2.5; 8; 8; 2.5 SUSPENSION INTRAMUSCULAR at 03:56

## 2023-06-10 RX ADMIN — BACITRACIN 0.9 G: 500 OINTMENT TOPICAL at 04:28

## 2023-06-10 NOTE — ED PROVIDER NOTES
Subjective   History of Present Illness  Patient cut his right index finger while opening a package of fascia with a knife.  He is unsure of his tetanus status.    Finger Laceration  Location:  Right index finger  Quality:  Aching  Severity:  Mild  Duration:  4 hours  Timing:  Constant  Progression:  Waxing and waning  Chronicity:  New  Relieved by:  Pressure  Worsened by:  Movement  Associated symptoms: no abdominal pain, no chest pain, no congestion, no cough, no diarrhea, no ear pain, no fatigue, no fever, no headaches, no myalgias, no nausea, no rash, no rhinorrhea, no shortness of breath, no sore throat, no vomiting and no wheezing        Review of Systems   Constitutional: Negative for appetite change, chills, diaphoresis, fatigue and fever.   HENT: Negative for congestion, ear discharge, ear pain, nosebleeds, rhinorrhea, sinus pressure, sore throat and trouble swallowing.    Eyes: Negative for discharge and redness.   Respiratory: Negative for apnea, cough, chest tightness, shortness of breath and wheezing.    Cardiovascular: Negative for chest pain.   Gastrointestinal: Negative for abdominal pain, diarrhea, nausea and vomiting.   Endocrine: Negative for polyuria.   Genitourinary: Negative for dysuria, frequency and urgency.   Musculoskeletal: Negative for myalgias and neck pain.   Skin: Positive for wound. Negative for color change and rash.   Allergic/Immunologic: Negative for immunocompromised state.   Neurological: Negative for dizziness, seizures, syncope, weakness, light-headedness and headaches.   Hematological: Negative for adenopathy. Does not bruise/bleed easily.   Psychiatric/Behavioral: Negative for behavioral problems and confusion.   All other systems reviewed and are negative.      History reviewed. No pertinent past medical history.    Allergies   Allergen Reactions   • Wasp Venom Swelling       History reviewed. No pertinent surgical history.    Family History   Problem Relation Age of Onset    • Heart disease Mother        Social History     Socioeconomic History   • Marital status: Single   Tobacco Use   • Smoking status: Every Day     Packs/day: 1.00     Types: Cigarettes   • Smokeless tobacco: Never   • Tobacco comments:     7-8 cigarettes daily    Substance and Sexual Activity   • Alcohol use: Yes   • Drug use: No     Types: Marijuana   • Sexual activity: Yes     Partners: Female           Objective   Physical Exam  Vitals and nursing note reviewed.   Constitutional:       Appearance: He is well-developed.   HENT:      Head: Normocephalic and atraumatic.      Nose: Nose normal.   Eyes:      General: No scleral icterus.        Right eye: No discharge.         Left eye: No discharge.      Conjunctiva/sclera: Conjunctivae normal.      Pupils: Pupils are equal, round, and reactive to light.   Neck:      Trachea: No tracheal deviation.   Cardiovascular:      Rate and Rhythm: Normal rate and regular rhythm.      Heart sounds: Normal heart sounds. No murmur heard.  Pulmonary:      Effort: Pulmonary effort is normal. No respiratory distress.      Breath sounds: Normal breath sounds. No stridor. No wheezing or rales.   Abdominal:      General: Bowel sounds are normal. There is no distension.      Palpations: Abdomen is soft. There is no mass.      Tenderness: There is no abdominal tenderness. There is no guarding or rebound.   Musculoskeletal:      Right hand: Laceration present.        Hands:       Cervical back: Normal range of motion and neck supple.      Comments: 2 cm flap laceration noted over the dorsal aspect of the distal phalanx of th right index finger.   Skin:     General: Skin is warm and dry.      Findings: No erythema or rash.   Neurological:      Mental Status: He is alert and oriented to person, place, and time.      Coordination: Coordination normal.   Psychiatric:         Behavior: Behavior normal.         Thought Content: Thought content normal.         Laceration Repair    Date/Time:  6/10/2023 4:37 AM  Performed by: Tom Bronson MD  Authorized by: Tom Bronson MD     Consent:     Consent obtained:  Verbal    Consent given by:  Patient  Anesthesia:     Anesthesia method:  Nerve block    Block needle gauge:  25 G    Block anesthetic:  Lidocaine 1% w/o epi    Block technique:  Digital    Block injection procedure:  Anatomic landmarks identified    Block outcome:  Anesthesia achieved  Laceration details:     Location:  Finger    Finger location:  R index finger    Length (cm):  2  Pre-procedure details:     Preparation:  Patient was prepped and draped in usual sterile fashion  Exploration:     Hemostasis achieved with:  Direct pressure    Imaging outcome: foreign body not noted      Wound exploration: entire depth of wound visualized      Wound extent: no fascia violation noted, no foreign bodies/material noted, no muscle damage noted, no nerve damage noted, no tendon damage noted and no underlying fracture noted      Contaminated: no    Treatment:     Area cleansed with:  Chlorhexidine    Amount of cleaning:  Extensive    Irrigation solution:  Sterile saline    Irrigation method:  Pressure wash and syringe    Visualized foreign bodies/material removed: no    Skin repair:     Repair method:  Sutures    Suture size:  5-0    Suture material:  Nylon    Suture technique:  Simple interrupted    Number of sutures:  7  Approximation:     Approximation:  Close  Repair type:     Repair type:  Simple  Post-procedure details:     Dressing:  Antibiotic ointment and adhesive bandage    Procedure completion:  Tolerated well, no immediate complications               ED Course                                           MDM    Final diagnoses:   Laceration of right index finger without foreign body without damage to nail, initial encounter       ED Disposition  ED Disposition     ED Disposition   Discharge    Condition   Stable    Comment   --             [facility] -  FAMILY MEDICINE  [facility] [address] [place] [address] [phone]  In 1 week  For wound re-check, For suture/staple removal         Medication List      New Prescriptions    cephalexin 500 MG capsule  Commonly known as: KEFLEX  Take 1 capsule by mouth 4 (Four) Times a Day.           Where to Get Your Medications      These medications were sent to [facility]/pharmacy #[id number] - [place], [place] - [address] [place] [address] - [phone]  - [phone] [address] [address]    Phone: [phone]   · cephalexin 500 MG capsule          Tom Bronson MD  06/10/23 0446